# Patient Record
Sex: MALE | Race: WHITE | NOT HISPANIC OR LATINO | Employment: OTHER | URBAN - METROPOLITAN AREA
[De-identification: names, ages, dates, MRNs, and addresses within clinical notes are randomized per-mention and may not be internally consistent; named-entity substitution may affect disease eponyms.]

---

## 2017-03-28 DIAGNOSIS — Z76.82 AWAITING ORGAN TRANSPLANT STATUS: Primary | ICD-10-CM

## 2017-03-29 DIAGNOSIS — Z76.82 ORGAN TRANSPLANT CANDIDATE: Primary | ICD-10-CM

## 2017-05-08 ENCOUNTER — TELEPHONE (OUTPATIENT)
Dept: RADIOLOGY | Facility: HOSPITAL | Age: 52
End: 2017-05-08

## 2017-05-16 ENCOUNTER — LAB VISIT (OUTPATIENT)
Dept: LAB | Facility: HOSPITAL | Age: 52
End: 2017-05-16
Payer: MEDICARE

## 2017-05-16 DIAGNOSIS — Z76.82 ORGAN TRANSPLANT CANDIDATE: ICD-10-CM

## 2017-05-17 ENCOUNTER — TELEPHONE (OUTPATIENT)
Dept: RADIOLOGY | Facility: HOSPITAL | Age: 52
End: 2017-05-17

## 2017-05-18 ENCOUNTER — CLINICAL SUPPORT (OUTPATIENT)
Dept: CARDIOLOGY | Facility: CLINIC | Age: 52
End: 2017-05-18
Payer: MEDICARE

## 2017-05-18 ENCOUNTER — HOSPITAL ENCOUNTER (OUTPATIENT)
Dept: RADIOLOGY | Facility: HOSPITAL | Age: 52
Discharge: HOME | End: 2017-05-18
Attending: INTERNAL MEDICINE
Payer: MEDICARE

## 2017-05-18 DIAGNOSIS — Z76.82 ORGAN TRANSPLANT CANDIDATE: ICD-10-CM

## 2017-05-18 LAB
AORTIC VALVE REGURGITATION: ABNORMAL
DIASTOLIC DYSFUNCTION: NO
ESTIMATED PA SYSTOLIC PRESSURE: 32.38
RETIRED EF AND QEF - SEE NOTES: 60 (ref 55–65)
TRICUSPID VALVE REGURGITATION: ABNORMAL

## 2017-05-18 PROCEDURE — 76776 US EXAM K TRANSPL W/DOPPLER: CPT | Mod: TC,PO,TXP

## 2017-05-18 PROCEDURE — 71020 XR CHEST PA AND LATERAL: CPT | Mod: 26,TXP,, | Performed by: RADIOLOGY

## 2017-05-18 PROCEDURE — 93306 TTE W/DOPPLER COMPLETE: CPT | Mod: 26,S$PBB,TXP, | Performed by: INTERNAL MEDICINE

## 2017-05-18 PROCEDURE — 76776 US EXAM K TRANSPL W/DOPPLER: CPT | Mod: 26,TXP,, | Performed by: RADIOLOGY

## 2017-05-18 PROCEDURE — 76770 US EXAM ABDO BACK WALL COMP: CPT | Mod: 26,TXP,, | Performed by: RADIOLOGY

## 2017-05-18 PROCEDURE — 71020 XR CHEST PA AND LATERAL: CPT | Mod: TC,PO,TXP

## 2017-05-18 PROCEDURE — 76770 US EXAM ABDO BACK WALL COMP: CPT | Mod: TC,PO,TXP

## 2017-07-06 PROCEDURE — 86829 HLA CLASS I/II ANTIBODY QUAL: CPT | Mod: PO,TXP

## 2017-07-06 PROCEDURE — 86829 HLA CLASS I/II ANTIBODY QUAL: CPT | Mod: 91,PO,TXP

## 2017-07-07 LAB — HPRA INTERPRETATION: NORMAL

## 2017-07-13 ENCOUNTER — LAB VISIT (OUTPATIENT)
Dept: LAB | Facility: HOSPITAL | Age: 52
End: 2017-07-13
Payer: MEDICARE

## 2017-07-13 DIAGNOSIS — Z76.82 ORGAN TRANSPLANT CANDIDATE: ICD-10-CM

## 2017-07-13 PROCEDURE — 86833 HLA CLASS II HIGH DEFIN QUAL: CPT | Mod: PO,TXP

## 2017-07-13 PROCEDURE — 86832 HLA CLASS I HIGH DEFIN QUAL: CPT | Mod: PO,TXP

## 2017-07-27 ENCOUNTER — OFFICE VISIT (OUTPATIENT)
Dept: CARDIOLOGY | Facility: CLINIC | Age: 52
End: 2017-07-27
Payer: MEDICARE

## 2017-07-27 ENCOUNTER — OFFICE VISIT (OUTPATIENT)
Dept: TRANSPLANT | Facility: CLINIC | Age: 52
End: 2017-07-27
Payer: MEDICARE

## 2017-07-27 VITALS
TEMPERATURE: 98 F | BODY MASS INDEX: 25.56 KG/M2 | WEIGHT: 168.63 LBS | RESPIRATION RATE: 16 BRPM | SYSTOLIC BLOOD PRESSURE: 120 MMHG | HEART RATE: 63 BPM | HEIGHT: 68 IN | OXYGEN SATURATION: 100 % | DIASTOLIC BLOOD PRESSURE: 66 MMHG

## 2017-07-27 VITALS
WEIGHT: 168.88 LBS | HEIGHT: 68 IN | SYSTOLIC BLOOD PRESSURE: 114 MMHG | DIASTOLIC BLOOD PRESSURE: 64 MMHG | HEART RATE: 67 BPM | BODY MASS INDEX: 25.59 KG/M2

## 2017-07-27 DIAGNOSIS — Z76.82 ORGAN TRANSPLANT CANDIDATE: Primary | ICD-10-CM

## 2017-07-27 DIAGNOSIS — T86.12 FAILED KIDNEY TRANSPLANT: ICD-10-CM

## 2017-07-27 DIAGNOSIS — Q87.81 ALPORT SYNDROME: ICD-10-CM

## 2017-07-27 DIAGNOSIS — Z99.2 ANEMIA IN CHRONIC KIDNEY DISEASE, ON CHRONIC DIALYSIS: Chronic | ICD-10-CM

## 2017-07-27 DIAGNOSIS — N18.5 ANEMIA IN STAGE 5 CHRONIC KIDNEY DISEASE, NOT ON CHRONIC DIALYSIS: Chronic | ICD-10-CM

## 2017-07-27 DIAGNOSIS — D63.1 ANEMIA IN CHRONIC KIDNEY DISEASE, ON CHRONIC DIALYSIS: Chronic | ICD-10-CM

## 2017-07-27 DIAGNOSIS — Z01.818 PREOPERATIVE CLEARANCE: ICD-10-CM

## 2017-07-27 DIAGNOSIS — Z76.82 PATIENT ON WAITING LIST FOR KIDNEY TRANSPLANT: Chronic | ICD-10-CM

## 2017-07-27 DIAGNOSIS — N18.6 ESRD (END STAGE RENAL DISEASE): ICD-10-CM

## 2017-07-27 DIAGNOSIS — Q87.81 ALPORT SYNDROME: Primary | Chronic | ICD-10-CM

## 2017-07-27 DIAGNOSIS — Z76.82 AWAITING ORGAN TRANSPLANT: ICD-10-CM

## 2017-07-27 DIAGNOSIS — D63.1 ANEMIA IN STAGE 5 CHRONIC KIDNEY DISEASE, NOT ON CHRONIC DIALYSIS: Chronic | ICD-10-CM

## 2017-07-27 DIAGNOSIS — N18.6 ANEMIA IN CHRONIC KIDNEY DISEASE, ON CHRONIC DIALYSIS: Chronic | ICD-10-CM

## 2017-07-27 DIAGNOSIS — N18.6 ESRD (END STAGE RENAL DISEASE): Chronic | ICD-10-CM

## 2017-07-27 PROCEDURE — 99999 PR PBB SHADOW E&M-EST. PATIENT-LVL III: CPT | Mod: PBBFAC,TXP,, | Performed by: INTERNAL MEDICINE

## 2017-07-27 PROCEDURE — 99999 PR PBB SHADOW E&M-EST. PATIENT-LVL III: CPT | Mod: PBBFAC,TXP,, | Performed by: NURSE PRACTITIONER

## 2017-07-27 PROCEDURE — 99215 OFFICE O/P EST HI 40 MIN: CPT | Mod: S$PBB,TXP,, | Performed by: NURSE PRACTITIONER

## 2017-07-27 PROCEDURE — 99204 OFFICE O/P NEW MOD 45 MIN: CPT | Mod: S$PBB,TXP,, | Performed by: INTERNAL MEDICINE

## 2017-07-27 PROCEDURE — 99213 OFFICE O/P EST LOW 20 MIN: CPT | Mod: PBBFAC,27,TXP | Performed by: NURSE PRACTITIONER

## 2017-07-27 NOTE — LETTER
July 27, 2017      Gunnar Fernando MD  [address]  [facility] [address]           [facility]  [address]  [facility] [address]  Phone: [phone]          Patient: Erasto Low   MR Number: [id number]   YOB: 1965   Date of Visit: 7/27/2017       Dear Dr. Gunnar Fernando:    Thank you for referring Erasto Low to me for evaluation. Attached you will find relevant portions of my assessment and plan of care.    If you have questions, please do not hesitate to call me. I look forward to following Erasto Low along with you.    Sincerely,    Yana Grewal MD    Enclosure  CC:  No Recipients    If you would like to receive this communication electronically, please contact [email or web address] or [phone] to request more information on [employer] access.    For providers and/or their staff who would like to refer a patient to [facility], please contact us through our one-stop-shop provider referral line, [facility], at [phone].    If you feel you have received this communication in error or would no longer like to receive these types of communications, please e-mail [email or web address]

## 2017-07-27 NOTE — PROGRESS NOTES
Kidney Transplant Recipient Reevalulation    Referring Physician:   Current Nephrologist: Markel Roman  Waitlist Status: active  Dialysis Start Date: 2005    Subjective:     CC:  Six month reassessment of kidney transplant candidacy.    HPI:  Mr. Low is a 51 y.o. 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  He has been on the wait list for a kidney transplant at [facility] since 2003. Patient is currently on hemodialysis started on  2005. Patient is dialyzing on MWF schedule.  Patient reports that he is tolerating dialysis well.. He has a LUE AV fistula. Patient denies any recent hospitalizations or ED visits.    Cardiology f/u today  Recent labs and diagnostic tests reviewed    Overall feels well. No health concerns today.   Denies chest pain, SOB, leg pain, abdominal pain or LUTs.    Past Medical History:   Diagnosis Date    Alport syndrome     Anemia in chr kidney dis     Awaiting organ transplant 10/2/2014    Failed kidney transplant #1 LRD from mother 10/84-     Failed kidney transplant #2  donor 2001-2005     HTN (hypertension) 2014    Osteoporosis, unspecified     Renal dialysis status     Renal hypertension        Review of Systems   Constitutional: Negative for activity change, appetite change, chills, fatigue, fever and unexpected weight change.   HENT: Negative for congestion, facial swelling, postnasal drip, rhinorrhea, sinus pressure, sore throat and trouble swallowing.    Eyes: Negative for pain, redness and visual disturbance.   Respiratory: Negative for cough, chest tightness, shortness of breath and wheezing.    Cardiovascular: Negative.  Negative for chest pain, palpitations and leg swelling.   Gastrointestinal: Negative for abdominal pain, diarrhea, nausea and vomiting.   Genitourinary: Negative for dysuria, flank pain and urgency.   Musculoskeletal: Negative for gait problem, neck pain and neck stiffness.  "  Skin: Negative for rash.   Allergic/Immunologic: Negative for environmental allergies, food allergies and immunocompromised state.   Neurological: Negative for dizziness, weakness, light-headedness and headaches.   Psychiatric/Behavioral: Negative for agitation and confusion. The patient is not nervous/anxious.        Objective:   body mass index is 25.64 kg/m².  /66 (BP Location: Right arm, Patient Position: Sitting, BP Method: Automatic)   Pulse 63   Temp 97.8 °F (36.6 °C) (Oral)   Resp 16   Ht 5' 8" (1.727 m)   Wt 76.5 kg (168 lb 10.4 oz)   SpO2 100%   BMI 25.64 kg/m²     Physical Exam   Constitutional: He is oriented to person, place, and time. He appears well-developed and well-nourished.   HENT:   Head: Normocephalic.   Mouth/Throat: Oropharynx is clear and moist. No oropharyngeal exudate.   Eyes: Conjunctivae and EOM are normal. Pupils are equal, round, and reactive to light. No scleral icterus.   Neck: Normal range of motion. Neck supple.   Cardiovascular: Normal rate, regular rhythm and normal heart sounds.    Pulmonary/Chest: Effort normal and breath sounds normal.   Abdominal: Soft. Normal appearance and bowel sounds are normal. He exhibits no distension and no mass. There is no splenomegaly or hepatomegaly. There is no tenderness. There is no rebound, no guarding, no CVA tenderness, no tenderness at McBurney's point and negative Kerr's sign.   Musculoskeletal: Normal range of motion. He exhibits no edema.        Arms:  Lymphadenopathy:     He has no cervical adenopathy.   Neurological: He is alert and oriented to person, place, and time. He exhibits normal muscle tone. Coordination normal.   Skin: Skin is warm and dry.   Psychiatric: He has a normal mood and affect. His behavior is normal.   Vitals reviewed.       Labs:  Lab Results   Component Value Date    WBC 5.87 04/11/2016    HGB 9.1 (L) 04/11/2016    HCT 27.2 (L) 04/11/2016     (L) 04/11/2016    K 3.7 04/11/2016    CL 92 (L) " 2016    CO2 24 2016    BUN 69 (H) 2016    CREATININE 15.9 (H) 2016    EGFRNONAA 3 (A) 2016    CALCIUM 8.0 (L) 2016    PHOS 4.1 04/10/2016    MG 2.5 04/10/2016    ALBUMIN 2.8 (L) 2016    AST 24 2016    ALT 18 2016       No results found for: PREALBUMIN, BILIRUBINUA, GGT, AMYLASE, LIPASE, PROTEINUA, NITRITE, RBCUA, WBCUA    No results found for: HLAABCTYPE    Lab Results   Component Value Date    CPRA 98 2017    RB0HXYO  2017     B42,B67,Cw7,B81,B56,B55,B7,B73,B41,Cw16,A30,B27,A31,Cw8,Cw17,B54,B39,A23,B82,B48,B60,B8,B3B,B59,B37,A29,B61,B62,B49,B50,B13,B71,B72    CIABCLM A*29:02-- WEAK B76, CW12, A*24:03, B47 2017    CIIAB DR7,DR12,DQ5,DQ6,DQA1*01:01,DQA1*01:02,DQA1*01:03 2017    ABCMT WEAK DP14,        Labs were reviewed with the patient.    Pre-transplant Workup:   Reviewed with the patient.    Assessment:     1. Alport syndrome    2. Patient on waiting list for kidney transplant    3. Awaiting organ transplant    4. ESRD from Alport Syndrome, on RRT since 10/1984    5. Renal hypertension, stage 5 chronic kidney disease or end stage renal disease    6. Anemia in stage 5 chronic kidney disease, not on chronic dialysis    7. Failed kidney transplant X2 ( #1 LRD/mother 10/1984 and #2  donor 2001-2005)        Plan:     Transplant Candidacy:   Mr. Low is a suitable kidney transplant candidate.  He remains in overall stable health, and will remain active on the transplant list.    Mirian Jones NP       Follow-up:   In addition to the tests noted in the plan, Mr. Low will continue to have reevaluation as per the standing pre-kidney transplant protocol:  1. Monthly blood for PRA  2. Annual return to clinic, except HIV positive, > 65 years of age, or pancreas transplant candidates who will be scheduled to see transplant every 6 months while in pre-transplant phase  3. Annual re-testing: CXR, EKG, yearly  mammograms for women over 40 and PSA for males over 40, cardiology follow-up as recommended by initial cardiology pre-transplant evaluation  4. Renal ultrasound every 2 years  5. Baseline colonoscopy after age 50 and repeated as recommended    UNOS Patient Status  Functional Status: 60% - Requires occasional assistance but is able to care for needs  Physical Capacity: No Limitations

## 2017-07-27 NOTE — PROGRESS NOTES
Transplant Recipient Adult Psychosocial Assessment    Erasto Low  [address]  [facility] [address]    Telephone Information:   Mobile [phone]   Home  [phone] (home)  Work  There is no work phone number on file.  E-mail  No e-mail address on record    Sex: male  YOB: 1965  Age: 51 y.o.    Encounter Date: 7/27/2017  [place] Citizen: yes  Primary Language: English   Needed: no    Emergency Contact:  Name: Nerissa Pathak  Relationship: mother  Address: [address] [place] [address]  Phone Numbers:  [phone] (mobile)    Family/Social Support:   Number of dependents/: 0  Marital history: Single  Other family dynamics: Pt reported his mother, father, and two brothers are very supportive. Brothers:     Chris Pathak- [phone] (Lives in [place], [place])- Pt reported Chris received a kidney transplant about 4 years ago from [facility].   Brad Low- [phone] (Lives in [place], [place])     Household Composition:    Pt lives alone.    Do you and your caregivers have access to reliable transportation? yes  PRIMARY CAREGIVER: Nerissa Pathak will be primary caregiver, phone number [phone]. Pt's mother reported she had a kidney transplant about 8 years ago.      provided in-depth information to patient and caregiver regarding pre- and post-transplant caregiver role.   strongly encourages patient and caregiver to have concrete plan regarding post-transplant care giving, including back-up caregiver(s) to ensure care giving needs are met as needed.    Patient and Caregiver states understanding all aspects of caregiver role/commitment and is able/willing/committed to being caregiver to the fullest extent necessary.    Patient and Caregiver verbalizes understanding of the education provided today and caregiver responsibilities.         remains available. Patient and Caregiver agree to contact  in a timely  manner if concerns arise.      Able to take time off work without financial concerns: yes.     Additional Significant Others who will Assist with Transplant:  Name: Nick Pathak- [phone]. Pt's father was present for SW visit and verbalized commitment as backup caregiver.   Age: 69   City: [place] State: [place]  Relationship: father  Does person drive? yes    Name: Chris Pathak  Age: Not provided  City: [place] State: [place]  Relationship: brother  Does person drive? yes    Name: Brad Low   Age: Not provided  City: [place] State: [place]  Relationship: brother  Does person drive? yes    Living Will: no  Healthcare Power of : no  Advance Directives on file: <<no information> per medical record.  Verbally reviewed LW/HCPA information.   provided patient with copy of LW/HCPA documents and provided education on completion of forms.    Living Donors: No.    Highest Education Level: High School (9-12) or GED  Reading Ability: 12th grade  Reports difficulty with: N/A  Learns Best By:  Multi-modal instruction (written, hands on, and demonstration)     Status: no  VA Benefits: no     Working for Income: No  If no, reason not working: Disability    Spouse/Significant Other Employment: N/A    Disabled: yes: date disability began: , due to: ESRD. Pt has been transplanted x2 at [facility] in  and .    Monthly Income:  SS Disability: $800  Able to afford all costs now and if transplanted, including medications: yes. Pt reported he does not have any trouble affording medications at this time.   Patient and Caregiver verbalizes understanding of personal responsibilities related to transplant costs and the importance of having a financial plan to ensure that patients transplant costs are fully covered.      provided fundraising information/education.  Patient and Caregiver verbalizes understanding.   remains available.    Insurance:   Payor/Plan Subscr   "Sex Relation Sub. Ins. ID Effective Group Num   1. MEDICARE - [place]* ADRIEL RUBY 1965 Male  [id number] 8/1/1984                                    [address]   2. AETNA - AETNA* ADRIEL RUBY 1965 Male  [id number] 6/1/13 [id number]                                   [address], [place] [address]     Primary Insurance (for UNOS reporting): Public Insurance - Medicare FFS (Fee For Service)  Secondary Insurance (for UNOS reporting): Private Insurance  Patient and Caregiver verbalizes clear understanding that patient may experience difficulty obtaining and/or be denied insurance coverage post-surgery. This includes and is not limited to disability insurance, life insurance, health insurance, burial insurance, long term care insurance, and other insurances.    Patient and Caregiver also reports understanding that future health concerns related to or unrelated to transplantation may not be covered by patient's insurance.  Resources and information provided and reviewed.      Patient and Caregiver provides verbal permission to release any necessary information to outside resources for patient care and discharge planning.  Resources and information provided are reviewed.      Dialysis Adherence:  Patient and Caregiver reports high adherence. Pt reported he never misses treatment. Pt currently receiving treatment at [address] Ph: [phone]    Infusion Service: patient utilizing? no  Home Health: patient utilizing? no  DME: no  Pulmonary/Cardiac Rehab: no   ADLS:  Pt reported he is fully independent and still driving.     Adherence: Pt's adherence is medium-high. Pt reported he complies with all medication regimes and makes all medical appointments. Pt reported he follows all medical advice with the exception of tobacco use. Pt reported he started "dipping" since he was young and does not plan to stop.  Adherence education and counseling provided. " "    Per History Section:Past Medical History:   Diagnosis Date    Alport syndrome     Anemia in chr kidney dis     Awaiting organ transplant 10/2/2014    Failed kidney transplant #1 LRD from mother 10/84-     Failed kidney transplant #2  donor 2001-2005     HTN (hypertension) 2014    Osteoporosis, unspecified     Renal dialysis status     Renal hypertension      Social History   Substance Use Topics    Smoking status: Never Smoker    Smokeless tobacco: Current User    Alcohol use Yes      Comment: rarely     History   Drug Use No     History   Sexual Activity    Sexual activity: Not on file       Per Today's Psychosocial:  Tobacco: Patient reports "dipping" 1 can of tobacco per day.  Patient does not plan to quit.  Alcohol: none, patient denies any use.  Illicit Drugs/Non-prescribed Medications: none, patient denies any use.    Patient and Caregiver states clear understanding of the potential impact of substance use as it relates to transplant candidacy and is aware of possible random substance screening.  Substance abstinence/cessation counseling, education and resources provided and reviewed.     Arrests/DWI/Treatment/Rehab: patient denies    Psychiatric History:    Mental Health: pt denied any mental health issues at this time.   Psychiatrist/Counselor: none  Medications:  none  Suicide/Homicide Issues: pt denied past/current SI/HI   Safety at home: Pt reported he is free from all abuse including but not limited to physical, verbal, and/or sexual abuse.     Knowledge: Patient and Caregiver states having clear understanding and realistic expectations regarding the potential risks and potential benefits of organ transplantation and organ donation, agrees to discuss with health care team members and support system members and to utilize available resources and express questions and/or concerns in order to further facilitate the pt informed decision-making.  Resources and " information provided and reviewed.     Patient and Caregiver is aware of [facility]'s affiliation and/or partnership with agencies in home health care, LTAC, SNF, [facility], and other hospitals and clinics.    Understanding: Patient and Caregiver reports having a clear understanding of the many lifetime commitments involved with being a transplant recipient, including costs, compliance, medications, lab work, procedures, appointments, concrete and financial planning, preparedness, timely and appropriate communication of concerns, abstinence (ETOH, tobacco, illicit non-prescribed drugs), adherence to all health care team recommendations, support system and caregiver involvement, appropriate and timely resource utilization and follow-through, mental health counseling as needed/recommended, and patient and caregiver responsibilities.  Social Service Handbook, resources and detailed educational information provided and reviewed.  Educational information provided.    Patient and Caregiver also reports current and expected compliance with health care regime and states having a clear understanding of the importance of compliance.      Patient and Caregiver reports a clear understanding that risks and benefits may be involved with organ transplantation and with organ donation.      Patient and Caregiver also reports clear understanding that psychosocial risk factors may affect patient, and include but are not limited to feelings of depression, generalized anxiety, anxiety regarding dependence on others, post traumatic stress disorder, feelings of guilt and other emotional and/or mental concerns, and/or exacerbation of existing mental health concerns.  Detailed resources provided and discussed.     Patient and Caregiver agrees to access appropriate resources in a timely manner as needed and/or as recommended, and to communicate concerns appropriately.  Patient and Caregiver also reports a clear understanding of treatment options  "available.      reviewed education, provided additional information, and answered questions.    Feelings or Concerns: Pt denied any feelings/concerns at this time. Pt stated "I don't really have kidney transplant on my mind. I know I need it but I would rather just keep living my life without constantly thinking about it. If I get a call for a kidney I get it, if I don't, I don't."     Coping: Pt reported coping well with aid of family and staying busy. Pt reported he enjoys hunting and training his dogs. 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     Goals: Pt reported he would like to go on vacation without worrying about his HD tx.  Patient referred to Vocational Rehabilitation.    Interview Behavior: Patient and Caregiver presents as alert and oriented x 4, pleasant, good eye contact, well groomed, recall good, concentration/judgement good, average intelligence, calm, communicative, cooperative and asking and answering questions appropriately.          Transplant Social Work - Candidacy  Assessment/Plan:     Psychosocial Suitability: Patient presents as a suitable candidate for kidney transplant at this time. Based on psychosocial risk factors, patient presents as low risk, due to good social support, adequate caregiver plan with multiple committed caregivers, hx of x2 kidney transplant, multiple family members with hx of kidney transplant, no reported alcohol/drug abuse, and no reported mental health issues.   Although pt has limited finances pt reported he has not had difficulty affording his medications in the past with his previous kidney transplant.     Recommendations/Additional Comments:   -Fundraising  -Tobacco Cessation  -Local [facility]    Alicia Berger LMSW         "

## 2017-07-27 NOTE — LETTER
July 28, 2017        Markel Roman  [address]  [address]  [facility]   [place] [address]  Phone: [phone]  Fax: [phone]             [facility]- Transplant  [address]  [facility] [address]  Phone: [phone]   Patient: Erasto Low   MR Number: [id number]   YOB: 1965   Date of Visit: 7/27/2017       Dear Dr. Markel Roman    Thank you for referring Erasto Low to me for evaluation. Attached you will find relevant portions of my assessment and plan of care.    If you have questions, please do not hesitate to call me. I look forward to following Erasto Low along with you.    Sincerely,    Mirian Jones, NP    Enclosure    If you would like to receive this communication electronically, please contact [email or web address] or [phone] to request [employer] Link access.    [employer] Link is a tool which provides read-only access to select patient information with whom you have a relationship. Its easy to use and provides real time access to review your patients record including encounter summaries, notes, results, and demographic information.    If you feel you have received this communication in error or would no longer like to receive these types of communications, please e-mail [email or web address]

## 2017-07-27 NOTE — PROGRESS NOTES
"Subjective:   Patient ID:  Erasto Low is a 51 y.o. 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 \  Non smoker    NMSPECT  2016  Impression: NORMAL MYOCARDIAL PERFUSION  1. The perfusion scan is free of evidence for myocardial ischemia or injury.   2. Resting wall motion is physiologic.   3. Resting LV function is normal.   4. The ventricular volumes are normal at rest and stress.   5. The extracardiac distribution of radioactivity is normal.   6. There is evidence of right ventricular hypertrophy.     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, on RRT since 10/1984    Alport syndrome    Renal hypertension    Osteoporosis, unspecified    Anemia in chronic renal disease    Renal dialysis status    Awaiting organ transplant    Special screening for malignant neoplasms, colon    Colon cancer screening    Diverticulosis of colon (without mention of hemorrhage)    Benign neoplasm of colon    Fever    Bacteremia due to Staphylococcus    Endocarditis    Bacteremia due to Streptococcus     /64 (BP Location: Right arm, Patient Position: Sitting, BP Method: Automatic)   Pulse 67   Ht 5' 8" (1.727 m)   Wt 76.6 kg (168 lb 14 oz)   BMI 25.68 kg/m²   Body mass index is 25.68 kg/m².  CrCl cannot be calculated (Patient's most recent sCr result is older than the maximum 7 days allowed.).    Lab Results   Component Value Date     (L) 04/11/2016    K 3.7 04/11/2016    CL 92 (L) 04/11/2016    CO2 24 04/11/2016    BUN 69 (H) 04/11/2016    CREATININE 15.9 (H) 04/11/2016    GLU 96 04/11/2016    MG 2.5 04/10/2016    AST 24 04/11/2016    ALT 18 04/11/2016    ALBUMIN 2.8 (L) 04/11/2016    PROT 6.6 04/11/2016    BILITOT 0.4 04/11/2016    WBC " 5.87 04/11/2016    HGB 9.1 (L) 04/11/2016    HCT 27.2 (L) 04/11/2016    MCV 83 04/11/2016     04/11/2016    INR 1.1 10/19/2015    PSA 1.8 03/03/2016    CHOL 206 (H) 05/09/2013    HDL 43 05/09/2013    LDLCALC 120.0 05/09/2013    TRIG 214 (H) 05/09/2013       Current Outpatient Prescriptions   Medication Sig    amlodipine (NORVASC) 5 MG tablet Take 5 mg by mouth once daily.    clonazepam (KLONOPIN) 0.5 MG tablet Take 0.5 mg by mouth Once daily as needed. 1 tablet Oral    multivitamin capsule Take 1 capsule by mouth once daily.     No current facility-administered medications for this visit.        Review of Systems   Constitution: Negative for chills, decreased appetite, weakness, malaise/fatigue, night sweats, weight gain and weight loss.   Eyes: Negative for blurred vision, double vision, visual disturbance and visual halos.   Cardiovascular: Negative for chest pain, claudication, cyanosis, dyspnea on exertion, irregular heartbeat, leg swelling, near-syncope, orthopnea, palpitations, paroxysmal nocturnal dyspnea and syncope.   Respiratory: Negative for cough, hemoptysis, snoring, sputum production and wheezing.    Endocrine: Negative for cold intolerance, heat intolerance, polydipsia and polyphagia.   Hematologic/Lymphatic: Negative for adenopathy and bleeding problem. Does not bruise/bleed easily.   Skin: Negative for flushing, itching, poor wound healing and rash.   Musculoskeletal: Negative for arthritis, back pain, falls, gout, joint pain, joint swelling, muscle cramps, muscle weakness, myalgias, neck pain and stiffness.   Gastrointestinal: Negative for bloating, abdominal pain, anorexia, diarrhea, dysphagia, excessive appetite, flatus, hematemesis, jaundice, melena and nausea.   Genitourinary: Negative for hesitancy and incomplete emptying.   Neurological: Negative for aphonia, brief paralysis, difficulty with concentration, disturbances in coordination, excessive daytime sleepiness, dizziness, focal  weakness, light-headedness and loss of balance.   Psychiatric/Behavioral: Negative for altered mental status, depression, hallucinations, hypervigilance, memory loss, substance abuse and suicidal ideas. The patient does not have insomnia and is not nervous/anxious.        Objective:   Physical Exam   Constitutional: He is oriented to person, place, and time. He appears well-developed and well-nourished. No distress.   HENT:   Head: Normocephalic and atraumatic.   Nose: Nose normal.   Mouth/Throat: No oropharyngeal exudate.   Eyes: Conjunctivae and EOM are normal. Pupils are equal, round, and reactive to light. Right eye exhibits no discharge. Left eye exhibits no discharge. No scleral icterus.   Neck: Normal range of motion. Neck supple. No JVD present. No tracheal deviation present. No thyromegaly present.   Cardiovascular: Normal rate, regular rhythm, normal heart sounds and intact distal pulses.  Exam reveals no gallop and no friction rub.    No murmur heard.  Pulmonary/Chest: Effort normal and breath sounds normal. No stridor. No respiratory distress. He has no wheezes. He has no rales. He exhibits no tenderness.   Abdominal: Soft. Bowel sounds are normal. He exhibits no distension and no mass. There is no tenderness.   Musculoskeletal: He exhibits no edema or tenderness.   Lymphadenopathy:     He has no cervical adenopathy.   Neurological: He is alert and oriented to person, place, and time. He displays normal reflexes. No cranial nerve deficit. He exhibits normal muscle tone. Coordination normal.   Skin: Skin is warm. No rash noted. He is not diaphoretic. No erythema. No pallor.   Psychiatric: He has a normal mood and affect. His behavior is normal. Judgment and thought content normal.       Assessment:     1. Organ transplant candidate    2. Preoperative clearance    3. ESRD (end stage renal disease)    4. Alport syndrome    5. Anemia in chronic kidney disease, on chronic dialysis        Plan:   Patient has  has 4 negative NM stress tests in the last few years, he is very active with no cardiac sx,. Patient is low risk to moderate risk for perioperative MI can proceed with non cardiac surgery.   We discussed high LDL in the setting of ESRD and will benefit by statin, patient's father says that he would like to get this discussed with PCP which the son agreed with.  Counseled on importance of heart healthy diet low in saturated and trans fat and salt as well gradually starting a regular aerobic exercise regimen with goal of 30min 5x/week. Recommend BP diary. Call if systolic BP > 140 mmHg on checking repeatedly  All meds reviewed and are appropriate  Patient is compliant with his medications.      RTC PRN

## 2017-07-27 NOTE — PROGRESS NOTES
Patient was seen in listed [facility] transplant clinic for continued evaluation for kidney, kidney/pancreas or pancreas only transplant. The patient attended a group education session that discussed/reviewed the following aspects of transplantation: evaluation and selection committee process, UNOS waitlist management/multiple listings, types of organs offered (KDPI < 85%, KDPI > 85%, PHS increased risk, DCD), financial aspects, surgical procedures, dietary instruction pre- and post-transplant, health maintenance pre- and post-transplant, post-transplant hospitalization and outpatient follow-up, potential to participate in a research protocol, and medication management and side effects. A question and answer session was provided after the presentation.     The patient was seen by all members of the multi-disciplinary team to include: Nephrologist/PA, , Transplant Coordinator, and .      The patient reviewed and signed all consents for evaluation which were witnessed and sent to scanning into the EPIC chart.     The patient was given an education book and plan for further evaluation based on her individual assessment.      The patient was encouraged to call with any questions or concerns.

## 2017-07-31 DIAGNOSIS — Z76.82 ORGAN TRANSPLANT CANDIDATE: ICD-10-CM

## 2017-08-08 LAB — HPRA INTERPRETATION: NORMAL

## 2017-08-16 LAB
CLASS I ANTIBODIES - LUMINEX: NORMAL
CLASS I ANTIBODY COMMENTS - LUMINEX: NORMAL
CLASS II ANTIBODIES - LUMINEX: NORMAL
CLASS II ANTIBODY COMMENTS - LUMINEX: NORMAL
CPRA %: 95
SERUM COLLECTION DT - LUMINEX CLASS I: NORMAL
SERUM COLLECTION DT - LUMINEX CLASS II: NORMAL
SPCL1 TESTING DATE: NORMAL
SPCL2 TESTING DATE: NORMAL
SPCLU TESTING DATE: NORMAL

## 2017-10-03 ENCOUNTER — TELEPHONE (OUTPATIENT)
Dept: TRANSPLANT | Facility: CLINIC | Age: 52
End: 2017-10-03

## 2017-10-03 NOTE — TELEPHONE ENCOUNTER
I spoke with DR Gentile , the patient's doctor c/o an elevated PSA and MRI, which was indeterminate to differentiate prostate cancer vs BPH. IT is recommended to do the Prostate BX at this time. The patient will need to be cleared from urology c/o this.

## 2017-10-27 ENCOUNTER — LAB VISIT (OUTPATIENT)
Dept: LAB | Facility: HOSPITAL | Age: 52
End: 2017-10-27
Payer: MEDICARE

## 2017-10-27 DIAGNOSIS — Z76.82 ORGAN TRANSPLANT CANDIDATE: ICD-10-CM

## 2017-10-27 PROCEDURE — 86833 HLA CLASS II HIGH DEFIN QUAL: CPT | Mod: PO,TXP

## 2017-10-27 PROCEDURE — 86832 HLA CLASS I HIGH DEFIN QUAL: CPT | Mod: PO,TXP

## 2017-11-15 LAB — HPRA INTERPRETATION: NORMAL

## 2017-11-20 LAB
CLASS I ANTIBODIES - LUMINEX: NORMAL
CLASS I ANTIBODY COMMENTS - LUMINEX: NORMAL
CLASS II ANTIBODIES - LUMINEX: NORMAL
CLASS II ANTIBODY COMMENTS - LUMINEX: NORMAL
CPRA %: 94
SERUM COLLECTION DT - LUMINEX CLASS I: NORMAL
SERUM COLLECTION DT - LUMINEX CLASS II: NORMAL
SPCL1 TESTING DATE: NORMAL
SPCL2 TESTING DATE: NORMAL
SPCLU TESTING DATE: NORMAL

## 2017-11-28 ENCOUNTER — HOSPITAL ENCOUNTER (OUTPATIENT)
Dept: RADIOLOGY | Facility: HOSPITAL | Age: 52
Discharge: HOME | End: 2017-11-28
Attending: FAMILY MEDICINE
Payer: MEDICARE

## 2017-11-28 ENCOUNTER — OFFICE VISIT (OUTPATIENT)
Dept: INTERNAL MEDICINE | Facility: CLINIC | Age: 52
End: 2017-11-28
Payer: MEDICARE

## 2017-11-28 VITALS
SYSTOLIC BLOOD PRESSURE: 118 MMHG | TEMPERATURE: 97 F | WEIGHT: 173.5 LBS | HEART RATE: 70 BPM | BODY MASS INDEX: 24.84 KG/M2 | DIASTOLIC BLOOD PRESSURE: 70 MMHG | OXYGEN SATURATION: 96 % | HEIGHT: 70 IN

## 2017-11-28 DIAGNOSIS — Z01.818 PRE-OPERATIVE CLEARANCE: ICD-10-CM

## 2017-11-28 DIAGNOSIS — T86.12 FAILED KIDNEY TRANSPLANT: Primary | ICD-10-CM

## 2017-11-28 DIAGNOSIS — N18.6 END STAGE RENAL DISEASE: ICD-10-CM

## 2017-11-28 DIAGNOSIS — C61 PROSTATE CANCER: ICD-10-CM

## 2017-11-28 PROCEDURE — 71020 XR CHEST PA AND LATERAL: CPT | Mod: 26,NTX,, | Performed by: RADIOLOGY

## 2017-11-28 PROCEDURE — 99213 OFFICE O/P EST LOW 20 MIN: CPT | Mod: S$PBB,,, | Performed by: FAMILY MEDICINE

## 2017-11-28 PROCEDURE — 93005 ELECTROCARDIOGRAM TRACING: CPT | Mod: PBBFAC | Performed by: FAMILY MEDICINE

## 2017-11-28 PROCEDURE — 93010 ELECTROCARDIOGRAM REPORT: CPT | Mod: ,,, | Performed by: INTERNAL MEDICINE

## 2017-11-28 PROCEDURE — 71020 XR CHEST PA AND LATERAL: CPT | Mod: TC,TXP

## 2017-11-28 PROCEDURE — 99999 PR PBB SHADOW E&M-EST. PATIENT-LVL IV: CPT | Mod: PBBFAC,,, | Performed by: FAMILY MEDICINE

## 2017-11-28 PROCEDURE — 99214 OFFICE O/P EST MOD 30 MIN: CPT | Mod: PBBFAC,25,NTX | Performed by: FAMILY MEDICINE

## 2017-11-28 RX ORDER — LEVOFLOXACIN 750 MG/1
TABLET ORAL
Refills: 0 | COMMUNITY
Start: 2017-10-04 | End: 2019-03-26 | Stop reason: ALTCHOICE

## 2017-11-28 RX ORDER — CLONAZEPAM 0.5 MG/1
0.5 TABLET ORAL
COMMUNITY

## 2017-11-28 RX ORDER — MULTIVITAMIN
1 TABLET ORAL
COMMUNITY

## 2017-11-28 RX ORDER — SUCROFERRIC OXYHYDROXIDE 500 MG/1
TABLET, CHEWABLE ORAL
Refills: 1 | COMMUNITY
Start: 2017-08-30 | End: 2021-02-02

## 2017-11-28 RX ORDER — AMLODIPINE BESYLATE 5 MG/1
5 TABLET ORAL
COMMUNITY

## 2017-11-28 RX ORDER — DEXTROMETHORPHAN HYDROBROMIDE, GUAIFENESIN 5; 100 MG/5ML; MG/5ML
650 LIQUID ORAL
COMMUNITY

## 2017-11-28 NOTE — PROGRESS NOTES
Subjective:       Patient ID: Erasto Low is a 51 y.o. male.    Chief Complaint: Establish Care (to establish care ) and Pre-op Exam (pt needs preop clearance for cancer sx)    HPI     52 yo M    ESRD 2'2 Alport    Presents for Pre-op Clearance. Prostate Cancer    Alport Syndrome -h/o of 2 separate renal transplants.  First renal transplant - 1984 -   2 transplants - both have quit.  Has been on dialysis for many years. [facility] - dialysis schedule - in [place].    Dr. Sweeney is Cardiologist.  Reports history of potential endocarditis - reports no valvular damage. 1-2 years ago.    Prostate Cancer -   Found in [place] during work up for transplant.  Set for Dec 5th -   Dr. Fili Hernandez - [facility].  Fax [phone] phone [phone]    Trains dogs.  Active  No limitation   States he would be able to walk a mile without problems.  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  Denies any chest pain. Denies any shortness of breath.  Legs occasionally swell - if on feet from morning to dark - but not all the time.  No problems lying flat at night.  Never smoked.    Has had 4 negative stress test during past few years.     2D Echo - 5/18/2017  CONCLUSIONS     1 - No wall motion abnormalities.     2 - Normal left ventricular systolic function (EF 60-65%).     3 - Normal left ventricular diastolic function.     4 - Normal right ventricular systolic function .     5 - The estimated PA systolic pressure is 32 mmHg.     6 - Mild to moderate aortic regurgitation.     7 - Trivial tricuspid regurgitation.                Review of Systems   Constitutional: Negative for chills and fever.   HENT: Negative for sore throat and trouble swallowing.    Eyes: Negative for visual disturbance.   Respiratory: Negative for cough and shortness of breath.    Cardiovascular: Negative for chest pain and palpitations.   Genitourinary:        Makes no urine   Musculoskeletal: Negative for gait problem.   Skin: Negative for color change and rash.    Neurological: Negative for dizziness and light-headedness.       Objective:       Vitals:    17 0811   BP: 118/70   Pulse: 70   Temp: 97 °F (36.1 °C)       Physical Exam   Constitutional: He is oriented to person, place, and time. He appears well-developed and well-nourished. No distress.   HENT:   Head: Normocephalic and atraumatic.   Right Ear: Hearing, tympanic membrane, external ear and ear canal normal.   Left Ear: Hearing, tympanic membrane, external ear and ear canal normal.   Nose: Nose normal. Right sinus exhibits no maxillary sinus tenderness and no frontal sinus tenderness. Left sinus exhibits no maxillary sinus tenderness and no frontal sinus tenderness.   Mouth/Throat: Uvula is midline, oropharynx is clear and moist and mucous membranes are normal.   Eyes: Conjunctivae are normal. Right eye exhibits no discharge. Left eye exhibits no discharge.   Neck: Trachea normal, normal range of motion and full passive range of motion without pain.   Cardiovascular: Normal rate, regular rhythm, normal heart sounds and intact distal pulses.    Appreciate shunt flow murmur   Pulmonary/Chest: Effort normal and breath sounds normal. No respiratory distress. He has no decreased breath sounds. He has no wheezes.   Abdominal: Soft. Normal appearance and bowel sounds are normal. He exhibits no distension and no mass. There is no tenderness. There is no guarding. No hernia.   Musculoskeletal: Normal range of motion. He exhibits no edema or deformity.   Lymphadenopathy:     He has no cervical adenopathy.   Neurological: He is alert and oriented to person, place, and time.   Skin: Skin is warm, dry and intact. No rash noted. No erythema. No pallor.   Psychiatric: He has a normal mood and affect. His speech is normal and behavior is normal. Thought content normal.       Assessment:       1. 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 Prostate cancer    3.  Pre-operative clearance    4. End stage renal disease         Plan:   Failed kidney transplant X2 ( #1 LRD/mother 10/1984 and #2  donor 2001-2005)    Prostate cancer  Scheduled for robotic surgery  Dec 5th, 2017 - [facility].    Pre-operative clearance  Recently cleared 2017 by Cardiology  Has had numerous negative NM testing.  Patient reports he is fully functional - outside of dialysis and stays active with no exertional symptoms.  Called and discussed with Cardiology.  Cleared for surgery pending labs and CXR  Will provide surgeons.    Patient has had numerous surgeries and tolerated all without problem.  -     EKG 12-lead  -     X-Ray Chest PA And Lateral; Future; Expected date: 2017  -     CBC auto differential; Future; Expected date: 2017  -     Comprehensive metabolic panel; Future; Expected date: 2017  -     Protime-INR; Future; Expected date: 2017    End stage renal disease   -     Protime-INR; Future; Expected date: 2017  On transplant list.      No Follow-up on file.

## 2017-11-30 ENCOUNTER — TELEPHONE (OUTPATIENT)
Dept: INTERNAL MEDICINE | Facility: CLINIC | Age: 52
End: 2017-11-30

## 2017-11-30 NOTE — TELEPHONE ENCOUNTER
----- Message from Maite Zelaya sent at 11/29/2017  8:39 AM CST -----  Contact: Evelyn/[facility]  Caller request a call from the nurse regarding a corrective report on the pt, please contact the caller at [phone]

## 2018-02-02 ENCOUNTER — LAB VISIT (OUTPATIENT)
Dept: LAB | Facility: HOSPITAL | Age: 53
End: 2018-02-02
Payer: MEDICARE

## 2018-02-02 DIAGNOSIS — Z76.82 ORGAN TRANSPLANT CANDIDATE: ICD-10-CM

## 2018-02-02 PROCEDURE — 86832 HLA CLASS I HIGH DEFIN QUAL: CPT | Mod: PO,TXP

## 2018-02-02 PROCEDURE — 86833 HLA CLASS II HIGH DEFIN QUAL: CPT | Mod: PO,TXP

## 2018-02-08 LAB — HPRA INTERPRETATION: NORMAL

## 2018-02-12 LAB
CLASS I ANTIBODIES - LUMINEX: NORMAL
CLASS I ANTIBODY COMMENTS - LUMINEX: NORMAL
CLASS II ANTIBODIES - LUMINEX: NORMAL
CLASS II ANTIBODY COMMENTS - LUMINEX: NORMAL
CPRA %: 90
SERUM COLLECTION DT - LUMINEX CLASS I: NORMAL
SERUM COLLECTION DT - LUMINEX CLASS II: NORMAL
SPCL1 TESTING DATE: NORMAL
SPCL2 TESTING DATE: NORMAL
SPCLU TESTING DATE: NORMAL

## 2018-05-01 ENCOUNTER — LAB VISIT (OUTPATIENT)
Dept: LAB | Facility: HOSPITAL | Age: 53
End: 2018-05-01
Payer: MEDICARE

## 2018-05-01 DIAGNOSIS — Z76.82 ORGAN TRANSPLANT CANDIDATE: ICD-10-CM

## 2018-05-01 PROCEDURE — 86832 HLA CLASS I HIGH DEFIN QUAL: CPT | Mod: PO,TXP

## 2018-05-01 PROCEDURE — 86833 HLA CLASS II HIGH DEFIN QUAL: CPT | Mod: PO,TXP

## 2018-05-16 LAB — HPRA INTERPRETATION: NORMAL

## 2018-05-23 LAB
CLASS I ANTIBODIES - LUMINEX: NORMAL
CLASS I ANTIBODY COMMENTS - LUMINEX: NORMAL
CLASS II ANTIBODIES - LUMINEX: NORMAL
CLASS II ANTIBODY COMMENTS - LUMINEX: NORMAL
CPRA %: 92
SERUM COLLECTION DT - LUMINEX CLASS I: NORMAL
SERUM COLLECTION DT - LUMINEX CLASS II: NORMAL
SPCL1 TESTING DATE: NORMAL
SPCL2 TESTING DATE: NORMAL
SPCLU TESTING DATE: NORMAL

## 2018-06-26 ENCOUNTER — TELEPHONE (OUTPATIENT)
Dept: TRANSPLANT | Facility: CLINIC | Age: 53
End: 2018-06-26

## 2018-07-02 DIAGNOSIS — Z76.82 AWAITING ORGAN TRANSPLANT STATUS: Primary | ICD-10-CM

## 2018-10-10 DIAGNOSIS — Z76.82 ORGAN TRANSPLANT CANDIDATE: ICD-10-CM

## 2019-03-22 NOTE — PROGRESS NOTES
REFERRING PROVIDER  Aaareferral Self  No address on file  Subjective:   Patient: Erasto Low [id number], :1965   Visit date:3/26/2019 11:21 AM    Chief Complaint:  No chief complaint on file.    HPI:     Erasto Low is a 53 y.o. male whom I am asked to see for evaluation of diminished hearing in both ears for the past 3 weeks. There is a prior history of cerumen impaction.    His meds, allergies, medical, surgical, social & family histories were reviewed & updated:  -     He has a current medication list which includes the following prescription(s): acetaminophen, amlodipine, amlodipine, clonazepam, clonazepam, levofloxacin, multivitamin, multivitamin with folic acid, and velphoro.  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   -     He does not have any pertinent problems on file.   -     He  has a past surgical history that includes Kidney transplant; Kidney transplant; Hip Arthroplasty; Total shoulder arthroplasty; Nephrectomy (); AV fistula placement; Parathyroidectomy; Hernia repair; Lung removal, partial; and Kidney surgery.  -     He  reports that  has never smoked. His smokeless tobacco use includes snuff. He reports that he drinks alcohol. He reports that he does not use drugs.  -     His family history includes Hypertension in his brother, father, maternal uncle, and mother; Kidney disease in his brother, maternal uncle, and mother.  -     He is allergic to ether; cyclosporine; and ether.      Review of Systems:  -     Allergic/Immunologic: is allergic to ether; cyclosporine; and ether..  -     Constitutional: Current temp:          Objective:     Physical Exam:  Vitals:  There  were no vitals taken for this visit.  Communication:  Able to communicate, no hoarseness.  Head & Face:  Normocephalic, atraumatic, no sinus tenderness.  Eyes:  Extraocular motions intact.  Ears:  Otoscopy of external auditory canals reveals impaction of bilateral ear canals.  With the patient in the supine position, we used the operating microscope to examine both ears with the appropriate sized ear speculum.  A variety of sterile, micro-instruments were utilized to remove the cerumen atraumatically from the impacted ear(s).   After removal, the ears were reexamined-  Right Ear:  No mass/lesion of auricle. The external auditory canals is without erythema or discharge. Pneumatic otoscopy of the tympanic membrane revealed no perforation and good mobility, with no fluid in middle ear. Clinical speech reception thresholds grossly normal.  Left Ear:  No mass/lesion of auricle. The external auditory canals is without erythema or discharge. Pneumatic otoscopy of the tympanic membrane revealed no perforation and good mobility, with no fluid in middle ear. Clinical speech reception thresholds grossly normal  Nose:  No masses/lesions of external nose, nasal mucosa, septum, and turbinates were within normal limits.  Mouth:  No mass/lesion of lips, teeth, gums, hard/soft palate, tongue, tonsils, or oropharynx.  Neck & Lymphatics:  No cervical lymphadenopathy, no neck mass/crepitus/ asymmetry, trachea is midline, no thyroid enlargement/tenderness/mass.  Neuro/Psych: Alert with normal mood and affect.   Respiration/Chest:  Symmetric expansion during respiration, normal respiratory effort.  Skin:  Warm and intact.    Assessment & Plan:       -     Cerumen Impaction - Erasto has cerumen impaction.  Impaction was remoed without difficulty and the patient tolerated this well. We discussed preventative measures and treatment options.  Q-tips must be avoided, instead the ears can be cleaned with OTC ear rinses (or a mixture of alcohol &  vinegar in equal parts).   For hard wax, Erasto may place mineral oil/baby oil in the ear with a cotton ball at night and remove in the shower.  This will assist in softening the wax and allow it to drain out on its own. If the cerumen impacts the ear canal and causes hearing loss or infection he needs to follow-up in the clinic for treatment and cleaning.

## 2019-03-26 ENCOUNTER — OFFICE VISIT (OUTPATIENT)
Dept: OTOLARYNGOLOGY | Facility: CLINIC | Age: 54
End: 2019-03-26
Payer: MEDICARE

## 2019-03-26 VITALS
SYSTOLIC BLOOD PRESSURE: 134 MMHG | BODY MASS INDEX: 25.35 KG/M2 | HEART RATE: 64 BPM | DIASTOLIC BLOOD PRESSURE: 82 MMHG | WEIGHT: 174.19 LBS

## 2019-03-26 DIAGNOSIS — H61.23 BILATERAL IMPACTED CERUMEN: Primary | ICD-10-CM

## 2019-03-26 PROCEDURE — 99999 PR PBB SHADOW E&M-EST. PATIENT-LVL III: ICD-10-PCS | Mod: PBBFAC,TXP,, | Performed by: OTOLARYNGOLOGY

## 2019-03-26 PROCEDURE — 99213 OFFICE O/P EST LOW 20 MIN: CPT | Mod: S$PBB,TXP,, | Performed by: OTOLARYNGOLOGY

## 2019-03-26 PROCEDURE — 99213 PR OFFICE/OUTPT VISIT, EST, LEVL III, 20-29 MIN: ICD-10-PCS | Mod: S$PBB,TXP,, | Performed by: OTOLARYNGOLOGY

## 2019-03-26 PROCEDURE — 99999 PR PBB SHADOW E&M-EST. PATIENT-LVL III: CPT | Mod: PBBFAC,TXP,, | Performed by: OTOLARYNGOLOGY

## 2019-03-26 PROCEDURE — 99213 OFFICE O/P EST LOW 20 MIN: CPT | Mod: PBBFAC,TXP | Performed by: OTOLARYNGOLOGY

## 2019-11-11 DIAGNOSIS — Z76.82 ORGAN TRANSPLANT CANDIDATE: Primary | ICD-10-CM

## 2019-11-11 DIAGNOSIS — I13.11 HYPERTENSIVE HEART DISEASE WITH END STAGE CHRONIC KIDNEY DISEASE ON DIALYSIS, UNSPECIFIED WHETHER HEART FAILURE PRESENT: ICD-10-CM

## 2019-11-11 DIAGNOSIS — N18.6 HYPERTENSIVE HEART DISEASE WITH END STAGE CHRONIC KIDNEY DISEASE ON DIALYSIS, UNSPECIFIED WHETHER HEART FAILURE PRESENT: ICD-10-CM

## 2019-11-11 DIAGNOSIS — Z99.2 HYPERTENSIVE HEART DISEASE WITH END STAGE CHRONIC KIDNEY DISEASE ON DIALYSIS, UNSPECIFIED WHETHER HEART FAILURE PRESENT: ICD-10-CM

## 2019-11-12 ENCOUNTER — TELEPHONE (OUTPATIENT)
Dept: ENDOSCOPY | Facility: HOSPITAL | Age: 54
End: 2019-11-12

## 2019-11-13 ENCOUNTER — TELEPHONE (OUTPATIENT)
Dept: ENDOSCOPY | Facility: HOSPITAL | Age: 54
End: 2019-11-13

## 2019-11-13 NOTE — TELEPHONE ENCOUNTER
Attempted to call pt to schedule Endoscopy procedure. No answer. Left a message to return our call.

## 2019-11-18 ENCOUNTER — TELEPHONE (OUTPATIENT)
Dept: TRANSPLANT | Facility: CLINIC | Age: 54
End: 2019-11-18

## 2019-12-31 ENCOUNTER — TELEPHONE (OUTPATIENT)
Dept: RADIOLOGY | Facility: HOSPITAL | Age: 54
End: 2019-12-31

## 2020-01-23 ENCOUNTER — HOSPITAL ENCOUNTER (OUTPATIENT)
Dept: PULMONOLOGY | Facility: HOSPITAL | Age: 55
Discharge: HOME | End: 2020-01-23
Attending: NURSE PRACTITIONER
Payer: MEDICARE

## 2020-01-23 ENCOUNTER — HOSPITAL ENCOUNTER (OUTPATIENT)
Dept: RADIOLOGY | Facility: HOSPITAL | Age: 55
Discharge: HOME | End: 2020-01-23
Attending: NURSE PRACTITIONER
Payer: MEDICARE

## 2020-01-23 ENCOUNTER — HOSPITAL ENCOUNTER (OUTPATIENT)
Dept: CARDIOLOGY | Facility: HOSPITAL | Age: 55
Discharge: HOME | End: 2020-01-23
Attending: NURSE PRACTITIONER
Payer: MEDICARE

## 2020-01-23 DIAGNOSIS — I51.7 CARDIOMEGALY: ICD-10-CM

## 2020-01-23 DIAGNOSIS — I13.11 HYPERTENSIVE HEART DISEASE WITH END STAGE CHRONIC KIDNEY DISEASE ON DIALYSIS, UNSPECIFIED WHETHER HEART FAILURE PRESENT: ICD-10-CM

## 2020-01-23 DIAGNOSIS — Z99.2 HYPERTENSIVE HEART DISEASE WITH END STAGE CHRONIC KIDNEY DISEASE ON DIALYSIS, UNSPECIFIED WHETHER HEART FAILURE PRESENT: ICD-10-CM

## 2020-01-23 DIAGNOSIS — Z76.82 ORGAN TRANSPLANT CANDIDATE: ICD-10-CM

## 2020-01-23 DIAGNOSIS — N18.6 HYPERTENSIVE HEART DISEASE WITH END STAGE CHRONIC KIDNEY DISEASE ON DIALYSIS, UNSPECIFIED WHETHER HEART FAILURE PRESENT: ICD-10-CM

## 2020-01-23 LAB
AORTIC VALVE REGURGITATION: ABNORMAL
DIASTOLIC DYSFUNCTION: NO
ESTIMATED PA SYSTOLIC PRESSURE: 41
MITRAL VALVE REGURGITATION: ABNORMAL
RETIRED EF AND QEF - SEE NOTES: 60 (ref 55–65)
TRICUSPID VALVE REGURGITATION: ABNORMAL

## 2020-01-23 PROCEDURE — 93306 TTE W/DOPPLER COMPLETE: CPT | Mod: 26,TXP,, | Performed by: INTERNAL MEDICINE

## 2020-01-23 PROCEDURE — A9502 TC99M TETROFOSMIN: HCPCS | Mod: TXP

## 2020-01-23 PROCEDURE — 93018 NM MULTI PHARM STRESS CARDIAC COMPONENT: ICD-10-PCS | Mod: TXP,,, | Performed by: INTERNAL MEDICINE

## 2020-01-23 PROCEDURE — 93017 CV STRESS TEST TRACING ONLY: CPT | Mod: TXP

## 2020-01-23 PROCEDURE — 71046 X-RAY EXAM CHEST 2 VIEWS: CPT | Mod: TC,TXP

## 2020-01-23 PROCEDURE — 78452 HT MUSCLE IMAGE SPECT MULT: CPT | Mod: 26,TXP,, | Performed by: INTERNAL MEDICINE

## 2020-01-23 PROCEDURE — 93018 CV STRESS TEST I&R ONLY: CPT | Mod: TXP,,, | Performed by: INTERNAL MEDICINE

## 2020-01-23 PROCEDURE — 63600175 PHARM REV CODE 636 W HCPCS: Mod: TXP | Performed by: PHYSICIAN ASSISTANT

## 2020-01-23 PROCEDURE — 78452 NM MULTI PHARM STRESS CARDIAC COMPONENT: ICD-10-PCS | Mod: 26,TXP,, | Performed by: INTERNAL MEDICINE

## 2020-01-23 PROCEDURE — 93306 2D ECHO WITH COLOR FLOW DOPPLER: ICD-10-PCS | Mod: 26,TXP,, | Performed by: INTERNAL MEDICINE

## 2020-01-23 PROCEDURE — 72170 X-RAY EXAM OF PELVIS: CPT | Mod: TC,TXP

## 2020-01-23 PROCEDURE — 93306 TTE W/DOPPLER COMPLETE: CPT | Mod: TXP

## 2020-01-23 PROCEDURE — 93016 NM MULTI PHARM STRESS CARDIAC COMPONENT: ICD-10-PCS | Mod: TXP,,, | Performed by: INTERNAL MEDICINE

## 2020-01-23 PROCEDURE — 93016 CV STRESS TEST SUPVJ ONLY: CPT | Mod: TXP,,, | Performed by: INTERNAL MEDICINE

## 2020-01-23 RX ORDER — REGADENOSON 0.08 MG/ML
0.4 INJECTION, SOLUTION INTRAVENOUS ONCE
Status: COMPLETED | OUTPATIENT
Start: 2020-01-23 | End: 2020-01-23

## 2020-01-23 RX ADMIN — REGADENOSON 0.4 MG: 0.08 INJECTION, SOLUTION INTRAVENOUS at 02:01

## 2020-02-10 NOTE — PROGRESS NOTES
Iliac calcifications are present; prior Txp is present.      Need noncon CT pelvis and iliac doppler US.

## 2020-03-04 DIAGNOSIS — R93.89 ABNORMAL FINDINGS ON DIAGNOSTIC IMAGING OF OTHER SPECIFIED BODY STRUCTURES: ICD-10-CM

## 2020-03-04 DIAGNOSIS — Z76.82 ORGAN TRANSPLANT CANDIDATE: Primary | ICD-10-CM

## 2020-03-05 ENCOUNTER — HOSPITAL ENCOUNTER (OUTPATIENT)
Dept: RADIOLOGY | Facility: HOSPITAL | Age: 55
Discharge: HOME | End: 2020-03-05
Attending: NURSE PRACTITIONER
Payer: MEDICARE

## 2020-03-05 ENCOUNTER — OFFICE VISIT (OUTPATIENT)
Dept: TRANSPLANT | Facility: CLINIC | Age: 55
End: 2020-03-05
Payer: MEDICARE

## 2020-03-05 VITALS
HEART RATE: 64 BPM | DIASTOLIC BLOOD PRESSURE: 78 MMHG | BODY MASS INDEX: 26.1 KG/M2 | SYSTOLIC BLOOD PRESSURE: 149 MMHG | HEIGHT: 68 IN | OXYGEN SATURATION: 100 % | TEMPERATURE: 98 F | WEIGHT: 172.19 LBS

## 2020-03-05 DIAGNOSIS — T86.12 FAILED KIDNEY TRANSPLANT: ICD-10-CM

## 2020-03-05 DIAGNOSIS — Z76.82 AWAITING ORGAN TRANSPLANT: ICD-10-CM

## 2020-03-05 DIAGNOSIS — Z76.82 ORGAN TRANSPLANT CANDIDATE: ICD-10-CM

## 2020-03-05 DIAGNOSIS — N18.6 ESRD (END STAGE RENAL DISEASE): Primary | Chronic | ICD-10-CM

## 2020-03-05 DIAGNOSIS — Q87.81 ALPORT SYNDROME: Chronic | ICD-10-CM

## 2020-03-05 DIAGNOSIS — I12.9 RENAL HYPERTENSION: Chronic | ICD-10-CM

## 2020-03-05 PROCEDURE — 93978 VASCULAR STUDY: CPT | Mod: TC,TXP

## 2020-03-05 PROCEDURE — 76770 US EXAM ABDO BACK WALL COMP: CPT | Mod: TC,TXP

## 2020-03-05 PROCEDURE — 76776 US EXAM K TRANSPL W/DOPPLER: CPT | Mod: TC,TXP

## 2020-03-05 PROCEDURE — 76770 US EXAM ABDO BACK WALL COMP: CPT | Mod: 26,TXP,, | Performed by: RADIOLOGY

## 2020-03-05 PROCEDURE — 76770 US RETROPERITONEAL COMPLETE: ICD-10-PCS | Mod: 26,TXP,, | Performed by: RADIOLOGY

## 2020-03-05 PROCEDURE — 99215 OFFICE O/P EST HI 40 MIN: CPT | Mod: S$PBB,TXP,, | Performed by: INTERNAL MEDICINE

## 2020-03-05 PROCEDURE — 99999 PR PBB SHADOW E&M-EST. PATIENT-LVL IV: CPT | Mod: PBBFAC,TXP,, | Performed by: INTERNAL MEDICINE

## 2020-03-05 PROCEDURE — 99214 OFFICE O/P EST MOD 30 MIN: CPT | Mod: PBBFAC,25,TXP | Performed by: INTERNAL MEDICINE

## 2020-03-05 PROCEDURE — 93978 VASCULAR STUDY: CPT | Mod: 26,TXP,, | Performed by: RADIOLOGY

## 2020-03-05 PROCEDURE — 76776 US EXAM K TRANSPL W/DOPPLER: CPT | Mod: 26,TXP,, | Performed by: RADIOLOGY

## 2020-03-05 PROCEDURE — 99215 PR OFFICE/OUTPT VISIT, EST, LEVL V, 40-54 MIN: ICD-10-PCS | Mod: S$PBB,TXP,, | Performed by: INTERNAL MEDICINE

## 2020-03-05 PROCEDURE — 76776 US TRANSPLANT KIDNEY WITH DOPPLER: ICD-10-PCS | Mod: 26,TXP,, | Performed by: RADIOLOGY

## 2020-03-05 PROCEDURE — 99999 PR PBB SHADOW E&M-EST. PATIENT-LVL IV: ICD-10-PCS | Mod: PBBFAC,TXP,, | Performed by: INTERNAL MEDICINE

## 2020-03-05 PROCEDURE — 93978 US DOPP ILIACS BILATERAL: ICD-10-PCS | Mod: 26,TXP,, | Performed by: RADIOLOGY

## 2020-03-05 NOTE — PROGRESS NOTES
Kidney Transplant Recipient Reevalulation    Referring Physician:   Current Nephrologist: Markel Roman  Waitlist Status: inactive  Dialysis Start Date: 7/5/2005    Subjective:     CC:  Annual reassessment of kidney transplant candidacy.    HPI:  Mr. Low is a 54 y.o. year old White male with ESRD secondary to failed kidney transplant.  He has been on the wait list for a kidney transplant at [facility] since 5/2/2003. Patient is currently on hemodialysis started on 2003. Patient is dialyzing on MWF schedule.  Patient reports that he is tolerating dialysis well.. He has a LUE AV fistula. Patient denies any recent hospitalizations or ED visits.      Patient has the left native kidney and the 2 allografts in place. He is s/p right native nephrectomy     He denied any admission or ER visit.    He stays very active playing with his dogs, house work, cleaning the house.     He walks daily. He feels tired denied SOB.    BP remains stable on dialysis.     He is here with the care givers    NO LIVING DONORS FOR THE KIDNEY TRANSPLANT     Acceptable functional capacity  And frailty index. He looks older than the states age.             Current Outpatient Medications on File Prior to Visit   Medication Sig Dispense Refill    acetaminophen (TYLENOL) 650 MG TbSR Take 650 mg by mouth.      amLODIPine (NORVASC) 5 MG tablet Take 5 mg by mouth.      clonazePAM (KLONOPIN) 0.5 MG tablet Take 0.5 mg by mouth.      multivitamin with folic acid 400 mcg Tab Take 1 tablet by mouth.      VELPHORO 500 mg Chew CHEW AND SWALLOW TWO TABLETS THREE TIMES DAILY TAKE WITH FOOD  1    amlodipine (NORVASC) 5 MG tablet Take 5 mg by mouth once daily.      clonazepam (KLONOPIN) 0.5 MG tablet Take 0.5 mg by mouth Once daily as needed. 1 tablet Oral      multivitamin capsule Take 1 capsule by mouth once daily.       No current facility-administered medications on file prior to visit.         Review of Systems    Skin: no skin rash  CNS; no headaches,  "blurred vision, seizure, or syncope  ENT: No JVD,  Adenopathies,  nasal congestion. No oral lesions  Cardiac: No chest pain, dyspnea, claudication, edema or palpitations  Respiratory: No SOB, cough, hemoptysis   Gastro-intestinal: No diarrhea, constipation, abdominal pain, nausea, vomit. No ascitis  Genitourinary: no hematuria, dysuria, frequency, frequency  Musculoskeletal: joint pain, arthritis or vasculitic changes  Psych: alert awake, oriented, No cranial nerves deficit.      Objective:   body mass index is 26.1 kg/m².    Physical Exam    BP (!) 149/78 (BP Location: Right arm, Patient Position: Sitting, BP Method: Medium (Automatic))   Pulse 64   Temp 98 °F (36.7 °C) (Oral)   Ht 5' 8.11" (1.73 m)   Wt 78.1 kg (172 lb 2.9 oz)   SpO2 100%   BMI 26.10 kg/m²       Head: normocephalic  Neck: No JVD, cervical axillary, or femoral adenopathies  Heart: no murmurs, Normal s1 and s2, No gallops, no rubs, No murmurs  Lungs; CTA, good respiratory effort, no crackles  Abdomen: soft, non tender, no splenomegaly or hepatomegaly, no massess, no bruits  Extremities: No edema, skin rash, joint pain. LUE AVF  SNC: awake, alert oriented. Cranial nerves are intact, no focalized, sensitivity and strength preserved      Labs:  Lab Results   Component Value Date    WBC 5.87 11/28/2017    HGB 11.4 (L) 11/28/2017    HCT 34.5 (L) 11/28/2017     11/28/2017    K 3.9 11/28/2017    CL 95 11/28/2017    CO2 29 11/28/2017    BUN 28 (H) 11/28/2017    CREATININE 8.0 (H) 11/28/2017    EGFRNONAA 6.9 (A) 11/28/2017    CALCIUM 9.4 11/28/2017    PHOS 4.1 04/10/2016    MG 2.5 04/10/2016    ALBUMIN 3.9 11/28/2017    AST 23 11/28/2017    ALT 26 11/28/2017    PTH 81.0 (H) 03/05/2020       No results found for: PREALBUMIN, BILIRUBINUA, GGT, AMYLASE, LIPASE, PROTEINUA, NITRITE, RBCUA, WBCUA    No results found for: HLAABCTYPE    Lab Results   Component Value Date    CPRA 82 02/05/2020    JA9YXCQ  02/05/2020     " B42,B67,B55,B7,B56,B81,B73,B41,B54,A31,B27,A30,Cw16,Cw17,B39,B60,B59,B38,B82,B48,B8,B61,B50    CIABCLM WEAK Cw8, B62, B49, B13, A29, B72 2020    CIIAB DQ6,DQA1*01:01,    ABCMT WEAK        Labs were reviewed with the patient.    Pre-transplant Workup:   Reviewed with the patient.    Assessment:     1. 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 Renal hypertension    3. 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 Alport syndrome    5. Awaiting organ transplant        Plan:     Transplant Candidacy:   Mr. Low will be a suitable kidney transplant candidate once he undergoes follow up colonoscopy and we obtain clearance letter from urology .  Meets center eligibility for accepting HCV+ donor offer - yes.  Patient educated on HCV+ donors. Erasto is willing  to accept HCV+ donor offer -  yes   Patient is a candidate for KDPI > 85 kidney donor offer - yes.  He remains in overall stable health, and will remain active on the transplant list.    Will need a colonoscopy    PSA reviewed and was acceptable.     Will obtain Urology clearance letter    Extensive education provided regarding PRA, dialysis time and retransplantation after 17 yrs of dialysis    We discussed challenges of subsequent kidney transplants including infections malignancies     We spoke about possible offers after he is active in the waiting list    All questions answered     Gunnar Fernando MD       Follow-up:   In addition to the tests noted in the plan, Mr. Low will continue to have reevaluation as per the standing pre-kidney transplant protocol:  1. Monthly blood for PRA  2. Annual return to clinic, except HIV positive, > 65 years of age, or pancreas transplant candidates who will be scheduled to see transplant every 6 months while in pre-transplant phase  3. Annual re-testing: CXR, EKG, yearly mammograms for women over 40 and PSA for males over 40,  cardiology follow-up as recommended by initial cardiology pre-transplant evaluation  4. Renal ultrasound every 2 years  5. Baseline colonoscopy after age 50 and repeated as recommended    UNOS Patient Status  Functional Status: 60% - Requires occasional assistance but is able to care for needs  Physical Capacity: No Limitations

## 2020-03-05 NOTE — PROGRESS NOTES
PHARM.D. PRE-TRANSPLANT NOTE:     This patient's medication therapy was evaluated as part of his pre-transplant evaluation.      The following general pharmacologic concerns were noted: Klonopin should be resumed to prevent withdrawal;     The following pharmacologic concerns related to HCV therapy were noted: N/A      This patient's medication profile was reviewed for considerations for DAA Hepatitis C therapy:    [x]  No current inducers of CYP 3A4 or PGP  [x]  No amiodarone on this patient's EMR profile in the last 24 months  [x]  No past or current atrial fibrillation on this patient's EMR profile       Current Outpatient Medications   Medication Sig Dispense Refill    acetaminophen (TYLENOL) 650 MG TbSR Take 650 mg by mouth.      amLODIPine (NORVASC) 5 MG tablet Take 5 mg by mouth.      clonazePAM (KLONOPIN) 0.5 MG tablet Take 0.5 mg by mouth.      multivitamin with folic acid 400 mcg Tab Take 1 tablet by mouth.      VELPHORO 500 mg Chew CHEW AND SWALLOW TWO TABLETS THREE TIMES DAILY TAKE WITH FOOD  1    amlodipine (NORVASC) 5 MG tablet Take 5 mg by mouth once daily.      clonazepam (KLONOPIN) 0.5 MG tablet Take 0.5 mg by mouth Once daily as needed. 1 tablet Oral      multivitamin capsule Take 1 capsule by mouth once daily.       No current facility-administered medications for this visit.          Currently Mr. Low is responsible for preparing / administering this patient's medications on a daily basis.  I am available for consultation and can be contacted, as needed by the other members of the Kidney Transplant team.

## 2020-03-05 NOTE — LETTER
March 9, 2020        Markel Roman  [address]  [address]  [facility]   [place] [address]  Phone: [phone]  Fax: [phone]             [facility] Transplant  [address]  [facility] [address]  Phone: [phone]   Patient: Erasto Low   MR Number: [id number]   YOB: 1965   Date of Visit: 3/5/2020       Dear Dr. Markel Roman    Thank you for referring Erasto Low to me for evaluation. Attached you will find relevant portions of my assessment and plan of care.    If you have questions, please do not hesitate to call me. I look forward to following Erasto Low along with you.    Sincerely,    Gunnar Fernando MD    Enclosure    If you would like to receive this communication electronically, please contact [email or web address] or [phone] to request [employer] Link access.    [employer] Link is a tool which provides read-only access to select patient information with whom you have a relationship. Its easy to use and provides real time access to review your patients record including encounter summaries, notes, results, and demographic information.    If you feel you have received this communication in error or would no longer like to receive these types of communications, please e-mail [email or web address]

## 2020-03-09 NOTE — PROGRESS NOTES
Transplant Recipient Adult Psychosocial Assessment  Update from 7/27/2017    Erasto Low  [address] [place] [address]  Telephone Information:   Mobile [phone]   Home  [phone] (home)  Work  There is no work phone number on file.  E-mail  [email or web address]    Sex: male  YOB: 1965  Age: 54 y.o.    Encounter Date: 3/5/2020  [place] Citizen: yes  Primary Language: English   Needed: no    Emergency Contact:  Name: Nerissa Pathak  Relationship: mother  Address: [address] [place] [address]  Phone Numbers:  [phone] (mobile)    Family/Social Support:   Number of dependents/: Pt denies any minors.  Marital history: Pt denies any marriages or S.O.  Other family dynamics: Pt reported his mother, step-father and three brothers are very supportive. Two brothers live in [place] while the third brother lives in [place]. Pt has been transplanted x2 at [facility] in 1984 and 2000. Pt's mother had a kidney transplant from [facility] in 2009.     Household Composition:    Pt lives alone.    Do you and your caregivers have access to reliable transportation? yes  PRIMARY CAREGIVER: Nerissa Pathak, pt's mother, will be primary caregiver, phone number [phone].      provided in-depth information to patient and caregiver regarding pre- and post-transplant caregiver role.   strongly encourages patient and caregiver to have concrete plan regarding post-transplant care giving, including back-up caregiver(s) to ensure care giving needs are met as needed.    Patient and Caregiver states understanding all aspects of caregiver role/commitment and is able/willing/committed to being caregiver to the fullest extent necessary.    Patient and Caregiver verbalizes understanding of the education provided today and caregiver responsibilities.         remains available. Patient and Caregiver agree to contact  in a timely  manner if concerns arise.      Able to take time off work without financial concerns: yes.     Additional Significant Others who will Assist with Transplant:  Name: Nick Pathak- [phone].   Age: 72  City: [place] State: [place]  Relationship: father  Does person drive? yes    Name: Abelino Pathak # [phone]  Age: 40  City: [place] State: [place]  Relationship: brother  Does person drive? yes    Name: Brad Low # [phone]  City: [place] State: [place]  Relationship: brother  Does person drive? yes    Living Will: no  Healthcare Power of : no  Advance Directives on file: <<no information> per medical record.  Verbally reviewed LW/HCPA information.   provided patient with copy of LW/HCPA documents and provided education on completion of forms.    Living Donors: No. Pt reports already looking into potential living donors.    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   Learns Best By:  Pt learns best by a combination of verbal, written and hands on demonstration.      Status: no  VA Benefits: no     Working for Income: No  If no, reason not working: Disability     Spouse/Significant Other Employment: N/A    Disabled: yes: date disability began: 1984, due to: ESRD. Pt has been transplanted x2 at [facility] in 1984 and 2000.    Monthly Income:  SS Disability: $575  Able to afford all costs now and if transplanted, including medications: yes. Pt reported he does not have any trouble affording medications at this time. Pt reports parents and brothers can assist as needed.   Patient and Caregiver verbalizes understanding of personal responsibilities related to transplant costs and the importance of having a financial plan to ensure that patients transplant costs are fully covered.      provided fundraising information/education.  Patient and Caregiver  verbalizes understanding.   remains available.    Insurance:   Payor/Plan Subscr  Sex Relation Sub. Ins. ID Effective Group Num   1. MEDICARE - ME* ADRIEL RUBY 1965 Male  [id number] 1986                                    [address]   2. AETNA - AETNA* ADRIEL RUBY 1965 Male  [id number]                                   [address], [place] [address]     Primary Insurance (for UNOS reporting): Public Insurance - Medicare FFS (Fee For Service)  Secondary Insurance (for UNOS reporting): Private Insurance through Step-father's employer ([employer]).   Patient and Caregiver verbalizes clear understanding that patient may experience difficulty obtaining and/or be denied insurance coverage post-surgery. This includes and is not limited to disability insurance, life insurance, health insurance, burial insurance, long term care insurance, and other insurances.    Patient and Caregiver also reports understanding that future health concerns related to or unrelated to transplantation may not be covered by patient's insurance.  Resources and information provided and reviewed.      Patient and Caregiver provides verbal permission to release any necessary information to outside resources for patient care and discharge planning.  Resources and information provided are reviewed.      Dialysis Adherence:  Patient and Caregiver reports high adherence.   Trudi Llanes, nurse at pt's dialysis unit reports over the last three months that patient has had 0 AMAs, 0 no shows and no issues with caregiver support, transportation or medication management.     Infusion Service: patient utilizing? no  Home Health: patient utilizing? no  DME: no  Pulmonary/Cardiac Rehab: no   ADLS:  Pt reported he is fully independent and still driving.     Adherence: Pt reports high compliance. Adherence education and counseling provided.     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 10/84-     Failed kidney transplant #2  donor 2001-2005     HTN (hypertension) 2014    Osteoporosis, unspecified     Renal dialysis status(V45.11)     Renal hypertension      Social History     Tobacco Use    Smoking status: Never Smoker    Smokeless tobacco: Current User     Types: Snuff   Substance Use Topics    Alcohol use: Yes     Comment: rarely     Social History     Substance and Sexual Activity   Drug Use No     Social History     Substance and Sexual Activity   Sexual Activity Not on file       Per Today's Psychosocial:  Tobacco: Patient reports "dipping" 1 can of tobacco per day.  Patient does not plan to quit.  Alcohol: Pt reports drinking on very special ocassions.  Illicit Drugs/Non-prescribed Medications: none, patient denies any use.    Patient and Caregiver states clear understanding of the potential impact of substance use as it relates to transplant candidacy and is aware of possible random substance screening.  Substance abstinence/cessation counseling, education and resources provided and reviewed.     Arrests/DWI/Treatment/Rehab: patient denies    Psychiatric History:    Mental Health: pt denied any mental health issues at this time.   Psychiatrist/Counselor: none  Medications:  none  Suicide/Homicide Issues: pt denied past/current SI/HI   Safety at home: Pt denies any history of any abuse.     Knowledge: Patient and Caregiver states having clear understanding and realistic expectations regarding the potential risks and potential benefits of organ transplantation and organ donation, agrees to discuss with health care team members and support system members and to utilize available resources and express questions and/or concerns in order to further facilitate the pt informed decision-making.  Resources and " information provided and reviewed.     Patient and Caregiver is aware of [facility]'s affiliation and/or partnership with agencies in home health care, LTAC, SNF, [facility] and other hospitals and clinics.    Understanding: Patient and Caregiver reports having a clear understanding of the many lifetime commitments involved with being a transplant recipient, including costs, compliance, medications, lab work, procedures, appointments, concrete and financial planning, preparedness, timely and appropriate communication of concerns, abstinence (ETOH, tobacco, illicit non-prescribed drugs), adherence to all health care team recommendations, support system and caregiver involvement, appropriate and timely resource utilization and follow-through, mental health counseling as needed/recommended, and patient and caregiver responsibilities.  Social Service Handbook, resources and detailed educational information provided and reviewed.  Educational information provided.    Patient and Caregiver also reports current and expected compliance with health care regime and states having a clear understanding of the importance of compliance.      Patient and Caregiver reports a clear understanding that risks and benefits may be involved with organ transplantation and with organ donation.      Patient and Caregiver also reports clear understanding that psychosocial risk factors may affect patient, and include but are not limited to feelings of depression, generalized anxiety, anxiety regarding dependence on others, post traumatic stress disorder, feelings of guilt and other emotional and/or mental concerns, and/or exacerbation of existing mental health concerns.  Detailed resources provided and discussed.     Patient and Caregiver agrees to access appropriate resources in a timely manner as needed and/or as recommended, and to communicate concerns appropriately.  Patient and Caregiver also reports a clear understanding of treatment options  available.      reviewed education, provided additional information, and answered questions.    Feelings or Concerns: Pt denied any concerns. Pt is highly motivated for transplant.     Coping: Pt reports coping well with hobbies like fishing, hunting and training dogs.      Goals: Pt reports wanting to travel post transplant.  Patient referred to Vocational Rehabilitation.    Interview Behavior: Patient and Caregiver presents as alert and oriented x 4, pleasant, good eye contact, well groomed, recall good, concentration/judgement good, average intelligence, calm, communicative, cooperative and asking and answering questions appropriately. Pt presents with parents, per pt's request.         Transplant Social Work - Candidacy  Assessment/Plan:     Psychosocial Suitability: Patient presents as a suitable candidate for kidney transplant at this time. Based on psychosocial risk factors, patient presents as low risk, due to good social support, adequate caregiver plan with multiple committed caregivers, hx of x2 kidney transplant, multiple family members with hx of kidney transplant, no reported alcohol/drug abuse, and no reported mental health issues.       Recommendations/Additional Comments: SW recommends pt conduct fundraising to assist in the transplant process. SW recommends that pt remain aware of potential mental health concerns and contact the team if any concerns arise. SW recommends that pt remain abstinent from tobacco, ETOH and drug use. SW support pt's continued dialysis adherence. SW remains available to answer any questions or concerns that arise as the pt moves through the transplant process.     Laura Friend LMSW

## 2020-03-19 ENCOUNTER — HOSPITAL ENCOUNTER (OUTPATIENT)
Dept: RADIOLOGY | Facility: HOSPITAL | Age: 55
Discharge: HOME | End: 2020-03-19
Attending: NURSE PRACTITIONER
Payer: MEDICARE

## 2020-03-19 DIAGNOSIS — R93.89 ABNORMAL FINDINGS ON DIAGNOSTIC IMAGING OF OTHER SPECIFIED BODY STRUCTURES: ICD-10-CM

## 2020-03-19 PROCEDURE — 74176 CT ABD & PELVIS W/O CONTRAST: CPT | Mod: TC,TXP

## 2020-04-01 ENCOUNTER — TELEPHONE (OUTPATIENT)
Dept: ADMINISTRATIVE | Facility: HOSPITAL | Age: 55
End: 2020-04-01

## 2020-04-01 NOTE — TELEPHONE ENCOUNTER
Need updated PCP info. Last visit with Dr. Rubalcava was 11/2017. [facility] for a return call.

## 2020-04-03 NOTE — PROGRESS NOTES
2 prior kidney transplants on left and right.   Would need midline approach.  High risk. Not prohibitive

## 2020-06-03 ENCOUNTER — TELEPHONE (OUTPATIENT)
Dept: INTERNAL MEDICINE | Facility: CLINIC | Age: 55
End: 2020-06-03

## 2020-06-03 ENCOUNTER — TELEPHONE (OUTPATIENT)
Dept: ENDOSCOPY | Facility: HOSPITAL | Age: 55
End: 2020-06-03

## 2020-06-03 DIAGNOSIS — Z76.82 AWAITING ORGAN TRANSPLANT: Primary | ICD-10-CM

## 2020-06-03 NOTE — TELEPHONE ENCOUNTER
Pt called to schedule colonoscopy. Advised pt's caregiver(Nerissa) to have PCP send order for scheduling. [email or web address]

## 2020-06-03 NOTE — TELEPHONE ENCOUNTER
----- Message from Brittani Gaitan sent at 6/3/2020  2:33 PM CDT -----  Contact: Mother-Melva Multani is requesting call back in regards to questions about getting order for colonoscopy            Pls call back at [phone]

## 2020-06-04 ENCOUNTER — TELEPHONE (OUTPATIENT)
Dept: INTERNAL MEDICINE | Facility: CLINIC | Age: 55
End: 2020-06-04

## 2020-06-04 ENCOUNTER — TELEPHONE (OUTPATIENT)
Dept: ENDOSCOPY | Facility: HOSPITAL | Age: 55
End: 2020-06-04

## 2020-06-04 DIAGNOSIS — Z12.11 COLON CANCER SCREENING: Primary | ICD-10-CM

## 2020-06-04 DIAGNOSIS — Z12.11 ENCOUNTER FOR SCREENING COLONOSCOPY: ICD-10-CM

## 2020-06-04 DIAGNOSIS — Z12.11 SCREEN FOR COLON CANCER: Primary | ICD-10-CM

## 2020-06-04 RX ORDER — POLYETHYLENE GLYCOL 3350, SODIUM SULFATE ANHYDROUS, SODIUM BICARBONATE, SODIUM CHLORIDE, POTASSIUM CHLORIDE 236; 22.74; 6.74; 5.86; 2.97 G/4L; G/4L; G/4L; G/4L; G/4L
4 POWDER, FOR SOLUTION ORAL ONCE
Qty: 4000 ML | Refills: 0 | Status: SHIPPED | OUTPATIENT
Start: 2020-06-04 | End: 2020-06-04

## 2020-06-04 NOTE — TELEPHONE ENCOUNTER
----- Message from Gloria Cunningham sent at 6/4/2020  9:43 AM CDT -----  Contact: self  [phone]  Patient is returning a phone call.  Who left a message for the patient: Nader  Does patient know what this is regarding:  appointment  Comments:

## 2020-06-04 NOTE — TELEPHONE ENCOUNTER
COVID Screening     1. Have you had a fever in the last 7 days or have you used fever reducing medicines for a fever in the last 7 days?  no    2. Are you experiencing shortness of breath, cough, muscle aches, loss of taste or loss of smell?  no    3. Are you residing with anyone who has tested positive for Covid?  no    If answered yes to any of the above questions, the pt must be scheduled for an appointment with their PCP.    A message also needs to be sent to the endoscopist to ensure the patient gets rescheduled at a later date.     ENDO screening    1. Have you been admitted overnight to the hospital in the past 3 months? no   If yes, schedule an appointment with PCP before scheduling endoscopic procedure.     2. Have you had a stent placed in the last 12 months? no   If yes, for a screening visit, cancel and message the ordering provider.  The patient will need a new order when the time is appropriate.     3. Have you had a stroke or heart attack in the past 6 months? no   If yes, cancel and refer patient to ordering provider for clearance, also message ordering provider to inform.     4. Have you had any chest pain in the past 3 months? no   If yes, Have you been evaluated by your PCP and/or cardiologist and it was determined to not be heart related? not applicable   If No, Pt needs to be seen by PCP or Cardiologist .  Pt can be scheduled once clearance obtained by either of those providers.     5. Do you take prescription weight loss medications?  no   If yes, must stop for 2 weeks prior to procedure.     6. Have you been diagnosed with diverticulitis within the past 3 months? no   If yes, must have been seen by GI within the last 3 months, if not schedule with GI BRITANY.    If pt has been seen by GI, schedule procedure 8-12 weeks post antibiotic treatment.     7. Are you on Dialysis? yes  If yes, schedule procedure for the day AFTER dialysis.  Appt time should be 9am or later, patient arrival time is 2 hours  "prior.  Nulytely or    miralax prep for all patients with kidney disease.     8. Are you diabetic?  no   If yes, schedule morning appt. Advise pt to hold all diabetic meds day of procedure.     9. If pt is older than 80 years of age and HAS NOT been seen by GI or PCP within the last 6 months, needs appt with GI BRITANY.   If pt has been seen by the GI provider or PCP within the past 6  months AND meets criteria, schedule procedure AND send message to the endoscopist.     10. Is patient on a "high risk" medication (blood thinner/antiplatelet agent)?  no   If yes, has cardiac clearance been obtained within the last 60 days? N/A   If no, a new clearance needs to be obtained.       I have reviewed the last colonoscopy for recommendations regarding next procedure bowel prep.  yes  I have reviewed medications and allergies.  yes  I have verified the pharmacy information and appropriate prep sent if needed. yes  Prep instructions have been mailed or sent to portal per patient request. yes    If answers yes to any of the following, schedule at [facility] ONLY. If No, OK for either location.     Is BMI over 45?   Any complaints of chest pain, new onset or at rest?  Does pt have an AICD?  Is there a diagnosis of heart failure?  Does patient have an insulin pump?  If procedure for esophageal banding?  "

## 2020-06-09 ENCOUNTER — TELEPHONE (OUTPATIENT)
Dept: INTERNAL MEDICINE | Facility: CLINIC | Age: 55
End: 2020-06-09

## 2020-06-09 ENCOUNTER — TELEPHONE (OUTPATIENT)
Dept: ENDOSCOPY | Facility: HOSPITAL | Age: 55
End: 2020-06-09

## 2020-06-09 NOTE — TELEPHONE ENCOUNTER
----- Message from Honey Connor sent at 6/9/2020  2:03 PM CDT -----  Contact: self  Pt is requesting a call back in regards to the scheduled procedures he has coming up on 06/25/2020. Please call back [phone]  Thanks.

## 2020-07-14 ENCOUNTER — TELEPHONE (OUTPATIENT)
Dept: FAMILY MEDICINE | Facility: CLINIC | Age: 55
End: 2020-07-14

## 2020-07-14 ENCOUNTER — TELEPHONE (OUTPATIENT)
Dept: ENDOSCOPY | Facility: HOSPITAL | Age: 55
End: 2020-07-14

## 2020-07-14 NOTE — TELEPHONE ENCOUNTER
----- Message from Mary Abrams sent at 7/14/2020 11:42 AM CDT -----  Pt is in need of instructions for procedure on 07/27. Please call back at [phone]

## 2020-07-21 ENCOUNTER — LAB VISIT (OUTPATIENT)
Dept: OTOLARYNGOLOGY | Facility: CLINIC | Age: 55
End: 2020-07-21
Payer: MEDICAID

## 2020-07-21 DIAGNOSIS — Z12.11 ENCOUNTER FOR SCREENING COLONOSCOPY: ICD-10-CM

## 2020-07-21 DIAGNOSIS — Z12.11 SCREEN FOR COLON CANCER: ICD-10-CM

## 2020-07-21 PROCEDURE — U0003 INFECTIOUS AGENT DETECTION BY NUCLEIC ACID (DNA OR RNA); SEVERE ACUTE RESPIRATORY SYNDROME CORONAVIRUS 2 (SARS-COV-2) (CORONAVIRUS DISEASE [COVID-19]), AMPLIFIED PROBE TECHNIQUE, MAKING USE OF HIGH THROUGHPUT TECHNOLOGIES AS DESCRIBED BY CMS-2020-01-R: HCPCS | Mod: NTX

## 2020-07-22 PROBLEM — Z86.010 HISTORY OF COLON POLYPS: Status: ACTIVE | Noted: 2020-07-22

## 2020-07-22 PROBLEM — Z86.0100 HISTORY OF COLON POLYPS: Status: ACTIVE | Noted: 2020-07-22

## 2020-07-23 ENCOUNTER — TELEPHONE (OUTPATIENT)
Dept: ENDOSCOPY | Facility: HOSPITAL | Age: 55
End: 2020-07-23

## 2020-07-23 ENCOUNTER — NURSE TRIAGE (OUTPATIENT)
Dept: ADMINISTRATIVE | Facility: CLINIC | Age: 55
End: 2020-07-23

## 2020-07-23 DIAGNOSIS — U07.1 COVID-19 VIRUS DETECTED: ICD-10-CM

## 2020-07-23 LAB — SARS-COV-2 RNA RESP QL NAA+PROBE: DETECTED

## 2020-07-23 NOTE — TELEPHONE ENCOUNTER
Pt father called and stated that [facility] [place] has attempted to contact Dr. Barroso office several times today to get COVID test results sent to the center so that they can schedule COVID specific dialysis for patient. Pt fathers states that he spoke with carmelina at [facility] [phone] center. Pt is not currently with pt at time of call.  Pt advise per protocol and verbalized understanding.     Reason for Disposition   [1] Caller is not with the adult (patient) AND [2] probable NON-URGENT symptoms    Additional Information   Negative: RN needs further essential information from caller in order to complete triage   Negative: Requesting regular office appointment   Negative: [1] Caller requesting NON-URGENT health information AND [2] PCP's office is the best resource   Negative: Question about upcoming scheduled test, no triage required and triager able to answer question   Negative: General information question, no triage required and triager able to answer question   Negative: Health Information question, no triage required and triager able to answer question    Protocols used: INFORMATION ONLY CALL-A-

## 2020-07-23 NOTE — PROGRESS NOTES
I attempted to call the patient and his next of kin.  I left a message about his positive COVID testing and the need to quarantine x 14 day on his message.  I also asked him to call us back.  I then called his next of kin and left a message to ask him to have the patient call us.  I have asked the Charge nurse to continue reaching out to him but we will need to cancel his procedure for today and reschedule in the future.

## 2020-07-24 ENCOUNTER — TELEPHONE (OUTPATIENT)
Dept: ENDOSCOPY | Facility: HOSPITAL | Age: 55
End: 2020-07-24
Payer: COMMERCIAL

## 2020-07-24 NOTE — TELEPHONE ENCOUNTER
Called number listed in message and this is not a working phone number. Googled number for [employer] Reyes, could not find employee named Pauly.

## 2020-08-01 ENCOUNTER — TELEPHONE (OUTPATIENT)
Dept: ENDOSCOPY | Facility: HOSPITAL | Age: 55
End: 2020-08-01

## 2020-09-21 ENCOUNTER — TELEPHONE (OUTPATIENT)
Dept: ENDOSCOPY | Facility: HOSPITAL | Age: 55
End: 2020-09-21

## 2020-09-21 DIAGNOSIS — Z13.9 SCREENING PROCEDURE: Primary | ICD-10-CM

## 2020-09-21 RX ORDER — POLYETHYLENE GLYCOL 3350, SODIUM SULFATE ANHYDROUS, SODIUM BICARBONATE, SODIUM CHLORIDE, POTASSIUM CHLORIDE 236; 22.74; 6.74; 5.86; 2.97 G/4L; G/4L; G/4L; G/4L; G/4L
4 POWDER, FOR SOLUTION ORAL ONCE
Qty: 4000 ML | Refills: 0 | Status: SHIPPED | OUTPATIENT
Start: 2020-09-21 | End: 2020-09-21

## 2020-09-21 NOTE — TELEPHONE ENCOUNTER
Attempted to schedule procedure, no answer. Left message to return call number provided. Also left message with pt's father. [email or web address]

## 2020-09-21 NOTE — TELEPHONE ENCOUNTER
Location Screening:    If answers yes to any of the following, schedule at [facility] ONLY. If No, OK for either location.    1. Is there a diagnosis of heart failure, severe heart valve disease (aortic stenosis) or mechanical valve? no  a. Is the Left Ventricle Ejection Fraction <30% ? no    2. Does the pt have pulmonary hypertension? no   a. Is pulmonary arterial pressure gradient >50mmHg? no   b. Is there evidence of right ventricular dysfunction? no    3. Does the pt have achalasia? no    4. Any history of negative reaction to anesthesia? no   a. Myasthenia gravis? no   b. Malignant hyperthermia? no   c. Other? no    5. Is procedure for esophageal banding? no      COVID Screening    1. Have you had a fever in the last 7 days or have you used fever reducing medicines for a fever in the last 7 days?  no    2. Are you experiencing shortness of breath, cough, muscle aches, loss of taste or loss of smell?  no    If answered yes to questions 1 and 2, the patient must seek medical attention with their PCP.  Do not schedule their procedure.     3. Are you residing with anyone who has tested positive for Covid?  no    If answered yes to question 3, recommend 14 day self-quarantine from the date of relative's positive test and place special needs note in the depot.  Wait to schedule.     ENDO screening    1. Have you been admitted for cardiac, kidney or pulmonary causes to the hospital in the past 3 months? no   If yes, schedule an appointment with PCP before scheduling endoscopic procedure.     2. Have you had a stent placed in the last 12 months? no   If yes, for a screening visit, cancel and message the ordering provider.  The patient will need a new order when the time is appropriate.     3. Have you had a stroke or heart attack in the past 6 months? no   If yes, cancel and refer patient to ordering provider for clearance, also message ordering provider to inform.     4. Have you had any chest pain in the past 3 months?  "no   If yes, Have you been evaluated by your PCP and/or cardiologist and it was determined to not be heart related? no   If No, Pt needs to be seen by PCP or Cardiologist .  Pt can be scheduled once clearance obtained by either of those providers.     5. Do you take prescription weight loss medications?  no   If yes, must stop for 2 weeks prior to procedure.     6. Have you been diagnosed with diverticulitis within the past 3 months? no   If yes, must have been seen by GI within the last 3 months, if not schedule with GI BR[facility].    If pt has been seen by GI, schedule procedure 8-12 weeks post antibiotic treatment.     7. Are you on Dialysis? yes  If yes, schedule procedure for the day AFTER dialysis.  Appt time should be 9am or later, patient arrival time is 2 hours prior.  Nulytely or miralax prep for all patients with kidney disease.     8. Are you diabetic?  no   If yes, schedule morning appt. Advise pt to hold all diabetic meds day of procedure.     9. If pt is older than 80 years of age and HAS NOT been seen by GI or PCP within the last 6 months, needs appt with GI BRITANY.   If pt has been seen by the GI provider or PCP within the past 6  months AND meets criteria, schedule procedure AND send message to the endoscopist.     10. Is patient on a "high risk" medication (blood thinner/antiplatelet agent)?  yes   If yes, has cardiac clearance been obtained within the last 60 days? No   If no, a new clearance needs to be obtained.     Final Questions:    1.I have reviewed the last colonoscopy for recommendations regarding next procedure bowel prep.  yes  2. I have reviewed medications and allergies.  yes  3. I have verified the pharmacy information and appropriate prep sent if needed. yes  4. Prep instructions have been mailed or sent to portal per patient request. yes        All schedulers will have ability to reach out to the ordering GI provider to clarify any issues.       "

## 2020-10-06 ENCOUNTER — PATIENT MESSAGE (OUTPATIENT)
Dept: ADMINISTRATIVE | Facility: HOSPITAL | Age: 55
End: 2020-10-06

## 2020-10-08 ENCOUNTER — HOSPITAL ENCOUNTER (OUTPATIENT)
Facility: HOSPITAL | Age: 55
Discharge: HOME | End: 2020-10-08
Attending: FAMILY MEDICINE | Admitting: FAMILY MEDICINE
Payer: COMMERCIAL

## 2020-10-08 ENCOUNTER — ANESTHESIA (OUTPATIENT)
Dept: ENDOSCOPY | Facility: HOSPITAL | Age: 55
End: 2020-10-08
Payer: MEDICARE

## 2020-10-08 ENCOUNTER — ANESTHESIA EVENT (OUTPATIENT)
Dept: ENDOSCOPY | Facility: HOSPITAL | Age: 55
End: 2020-10-08
Payer: MEDICARE

## 2020-10-08 VITALS
WEIGHT: 171.94 LBS | SYSTOLIC BLOOD PRESSURE: 112 MMHG | OXYGEN SATURATION: 100 % | DIASTOLIC BLOOD PRESSURE: 52 MMHG | TEMPERATURE: 98 F | HEIGHT: 68 IN | RESPIRATION RATE: 16 BRPM | BODY MASS INDEX: 26.06 KG/M2 | HEART RATE: 66 BPM

## 2020-10-08 DIAGNOSIS — K57.30 DIVERTICULOSIS OF COLON: Chronic | ICD-10-CM

## 2020-10-08 DIAGNOSIS — Z12.11 COLON CANCER SCREENING: Primary | ICD-10-CM

## 2020-10-08 DIAGNOSIS — K63.5 POLYP OF COLON, UNSPECIFIED PART OF COLON, UNSPECIFIED TYPE: ICD-10-CM

## 2020-10-08 DIAGNOSIS — Z86.010 HISTORY OF COLON POLYPS: ICD-10-CM

## 2020-10-08 LAB — POTASSIUM SERPL-SCNC: 4.6 MMOL/L (ref 3.5–5.1)

## 2020-10-08 PROCEDURE — 25000003 PHARM REV CODE 250: Mod: TXP | Performed by: NURSE ANESTHETIST, CERTIFIED REGISTERED

## 2020-10-08 PROCEDURE — 45385 PR COLONOSCOPY,REMV LESN,SNARE: ICD-10-PCS | Mod: TXP,,, | Performed by: FAMILY MEDICINE

## 2020-10-08 PROCEDURE — 84132 ASSAY OF SERUM POTASSIUM: CPT | Mod: NTX

## 2020-10-08 PROCEDURE — 63600175 PHARM REV CODE 636 W HCPCS: Mod: TXP | Performed by: NURSE ANESTHETIST, CERTIFIED REGISTERED

## 2020-10-08 PROCEDURE — 27201089 HC SNARE, DISP (ANY): Mod: NTX | Performed by: FAMILY MEDICINE

## 2020-10-08 PROCEDURE — 45380 PR COLONOSCOPY,BIOPSY: ICD-10-PCS | Mod: 59,TXP,, | Performed by: FAMILY MEDICINE

## 2020-10-08 PROCEDURE — 88305 TISSUE EXAM BY PATHOLOGIST: ICD-10-PCS | Mod: 26,NTX,, | Performed by: PATHOLOGY

## 2020-10-08 PROCEDURE — 45380 COLONOSCOPY AND BIOPSY: CPT | Mod: 59,TXP,, | Performed by: FAMILY MEDICINE

## 2020-10-08 PROCEDURE — 88305 TISSUE EXAM BY PATHOLOGIST: CPT | Mod: 26,NTX,, | Performed by: PATHOLOGY

## 2020-10-08 PROCEDURE — 45385 COLONOSCOPY W/LESION REMOVAL: CPT | Mod: NTX | Performed by: FAMILY MEDICINE

## 2020-10-08 PROCEDURE — 45380 COLONOSCOPY AND BIOPSY: CPT | Mod: TXP | Performed by: FAMILY MEDICINE

## 2020-10-08 PROCEDURE — 37000009 HC ANESTHESIA EA ADD 15 MINS: Mod: NTX | Performed by: FAMILY MEDICINE

## 2020-10-08 PROCEDURE — PATHNN PATH DEFICIENCY: ICD-10-PCS | Mod: ,,, | Performed by: FAMILY MEDICINE

## 2020-10-08 PROCEDURE — 37000008 HC ANESTHESIA 1ST 15 MINUTES: Mod: TXP | Performed by: FAMILY MEDICINE

## 2020-10-08 PROCEDURE — PATHNN PATH DEFICIENCY: Mod: ,,, | Performed by: FAMILY MEDICINE

## 2020-10-08 PROCEDURE — 45385 COLONOSCOPY W/LESION REMOVAL: CPT | Mod: TXP,,, | Performed by: FAMILY MEDICINE

## 2020-10-08 PROCEDURE — 27201012 HC FORCEPS, HOT/COLD, DISP: Mod: TXP | Performed by: FAMILY MEDICINE

## 2020-10-08 PROCEDURE — 88305 TISSUE EXAM BY PATHOLOGIST: CPT | Mod: TXP | Performed by: PATHOLOGY

## 2020-10-08 RX ORDER — LIDOCAINE HYDROCHLORIDE 10 MG/ML
INJECTION, SOLUTION EPIDURAL; INFILTRATION; INTRACAUDAL; PERINEURAL
Status: DISCONTINUED | OUTPATIENT
Start: 2020-10-08 | End: 2020-10-08

## 2020-10-08 RX ORDER — SODIUM CHLORIDE 0.9 % (FLUSH) 0.9 %
10 SYRINGE (ML) INJECTION
Status: DISCONTINUED | OUTPATIENT
Start: 2020-10-08 | End: 2021-02-02

## 2020-10-08 RX ORDER — SODIUM CHLORIDE, SODIUM LACTATE, POTASSIUM CHLORIDE, CALCIUM CHLORIDE 600; 310; 30; 20 MG/100ML; MG/100ML; MG/100ML; MG/100ML
INJECTION, SOLUTION INTRAVENOUS CONTINUOUS PRN
Status: DISCONTINUED | OUTPATIENT
Start: 2020-10-08 | End: 2020-10-08

## 2020-10-08 RX ORDER — PROPOFOL 10 MG/ML
VIAL (ML) INTRAVENOUS
Status: DISCONTINUED | OUTPATIENT
Start: 2020-10-08 | End: 2020-10-08

## 2020-10-08 RX ADMIN — PROPOFOL 40 MG: 10 INJECTION, EMULSION INTRAVENOUS at 01:10

## 2020-10-08 RX ADMIN — PROPOFOL 60 MG: 10 INJECTION, EMULSION INTRAVENOUS at 01:10

## 2020-10-08 RX ADMIN — SODIUM CHLORIDE, SODIUM LACTATE, POTASSIUM CHLORIDE, AND CALCIUM CHLORIDE: 600; 310; 30; 20 INJECTION, SOLUTION INTRAVENOUS at 12:10

## 2020-10-08 RX ADMIN — LIDOCAINE HYDROCHLORIDE 50 MG: 10 INJECTION, SOLUTION EPIDURAL; INFILTRATION; INTRACAUDAL; PERINEURAL at 01:10

## 2020-10-08 NOTE — ANESTHESIA POSTPROCEDURE EVALUATION
Anesthesia Post Evaluation    Patient: Erasto Low    Procedure(s) Performed: Procedure(s) (LRB):  COLONOSCOPY dialysis patient (N/A)    Final Anesthesia Type: MAC    Patient location during evaluation: PACU  Patient participation: Yes- Able to Participate  Level of consciousness: awake and alert  Post-procedure vital signs: reviewed and stable  Pain management: adequate  Airway patency: patent    PONV status at discharge: No PONV  Anesthetic complications: no      Cardiovascular status: blood pressure returned to baseline  Respiratory status: unassisted  Hydration status: euvolemic  Follow-up not needed.          Vitals Value Taken Time   /73 10/08/20 1237   Temp 36.5 °C (97.7 °F) 10/08/20 1235   Pulse 57 10/08/20 1235   Resp 18 10/08/20 1235   SpO2 99 % 10/08/20 1235         No case tracking events are documented in the log.      Pain/Julia Score: No data recorded

## 2020-10-08 NOTE — ANESTHESIA RELEASE NOTE
"Anesthesia Release from PACU Note    Patient: Erasto Low    Procedure(s) Performed: Procedure(s) (LRB):  COLONOSCOPY dialysis patient (N/A)    Anesthesia type: MAC    Post pain: Adequate analgesia    Post assessment: no apparent anesthetic complications    Last Vitals:   Visit Vitals  /73   Pulse (!) 57   Temp 36.5 °C (97.7 °F)   Resp 18   Ht 5' 8" (1.727 m)   Wt 78 kg (171 lb 15.3 oz)   SpO2 99%   BMI 26.15 kg/m²       Post vital signs: stable    Level of consciousness: awake    Nausea/Vomiting: no nausea/no vomiting    Complications: none    Airway Patency: patent    Respiratory: unassisted    Cardiovascular: stable and blood pressure at baseline    Hydration: euvolemic     "

## 2020-10-08 NOTE — ANESTHESIA PREPROCEDURE EVALUATION
10/08/2020  Erasto Low is a 54 y.o., male.    Anesthesia Evaluation    I have reviewed the Patient Summary Reports.    I have reviewed the Nursing Notes.       Review of Systems  Anesthesia Hx:  No problems with previous Anesthesia    Cardiovascular:   Exercise tolerance: good Hypertension    Renal/:   Chronic Renal Disease, ESRD, Dialysis        Physical Exam  General:  Well nourished    Airway/Jaw/Neck:  Airway Findings: Mouth Opening: Normal Tongue: Normal  General Airway Assessment: Adult  Mallampati: II  TM Distance: 4 - 6 cm  Jaw/Neck Findings:  Neck ROM: Normal ROM      Dental:  Dental Findings: In tact   Chest/Lungs:  Chest/Lungs Findings: Clear to auscultation, Normal Respiratory Rate     Heart/Vascular:  Heart Findings: Rate: Normal  Rhythm: Regular Rhythm  Sounds: Normal        Mental Status:  Mental Status Findings:  Cooperative, Alert and Oriented         Anesthesia Plan  Type of Anesthesia, risks & benefits discussed:  Anesthesia Type:  MAC  Patient's Preference:   Intra-op Monitoring Plan:   Intra-op Monitoring Plan Comments:   Post Op Pain Control Plan: multimodal analgesia  Post Op Pain Control Plan Comments:   Induction:   IV  Beta Blocker:         Informed Consent: Patient understands risks and agrees with Anesthesia plan.  Questions answered. Anesthesia consent signed with patient.  ASA Score: 4     Day of Surgery Review of History & Physical: I have interviewed and examined the patient. I have reviewed the patient's H&P dated:  There are no significant changes.  H&P update referred to the surgeon.  H&P completed by Anesthesiologist.       Ready For Surgery From Anesthesia Perspective.

## 2020-10-08 NOTE — DISCHARGE INSTRUCTIONS
Diverticulosis    Diverticulosis means that small pouches have formed in the wall of your large intestine (colon). Most often, this problem causes no symptoms and is common as people age. But the pouches in the colon are at risk of becoming infected. When this happens, the condition is called diverticulitis. Although most people with diverticulosis never develop diverticulitis, it is still not uncommon. Rectal bleeding can also occur and in less common situations, a type of colon inflammation called colitis.  While most people do not have symptoms, some people with diverticulosis may have:  · Abdominal cramps and pain  · Bloating  · Constipation  · Change in bowel habits  Causes  The exact cause of diverticulosis (and diverticulitis) has not been proved, but a few things are associated with the condition:  · Low-fiber diet  · Constipation  · Lack of exercise  Your healthcare provider will talk with you about how to manage your condition. Diet changes may be all that are needed to help control diverticulosis and prevent progression to diverticulitis. If you develop diverticulitis, you will likely need other treatments.  Home care  You may be told to take fiber supplements daily. Fiber adds bulk to the stool so that it passes through the colon more easily. Stool softeners may be recommended. You may also be given medications for pain relief. Be sure to take all medications as directed.  In the past, people were told to avoid corn, nuts, and seeds. This is no longer necessary.  Follow these guidelines when caring for yourself at home:  · Eat unprocessed foods that are high in fiber. Whole grains, fruits, and vegetables are good choices.  · Drink 6 to 8 glasses of water every day unless your healthcare provider has you limit how much fluid you should have.  · Watch for changes in your bowel movements. Tell your provider if you notice any changes.  · Begin an exercise program. Ask your provider how to get started.  Generally, walking is the best.  · Get plenty of rest and sleep.  Follow-up care  Follow up with your healthcare provider, or as advised. Regular visits may be needed to check on your health. Sometimes special procedures such as colonoscopy, are needed after an episode of diverticulitis or blooding. Be sure to keep all your appointments.  If a stool sample was taken, or cultures were done, you should be told if they are positive, or if your treatment needs to be changed. You can call as directed for the results.  If X-rays were done, a radiologist will look at them. You will be told if there is a change in your treatment.  If antibiotics were prescribed, be sure to finish them all.  When to seek medical advice  Call your healthcare provider right away if any of these occur:  · Fever of 100.4°F (38°C) or higher, or as directed by your healthcare provider  · Severe cramps in the lower left side of the abdomen or pain that is getting worse  · Tenderness in the lower left side of the abdomen or worsening pain throughout the abdomen  · Diarrhea or constipation that doesn't get better within 24 hours  · Nausea and vomiting  · Bleeding from the rectum  Call 911  Call emergency services if any of the following occur:  · Trouble breathing  · Confusion  · Very drowsy or trouble awakening  · Fainting or loss of consciousness  · Rapid heart rate  · Chest pain  Date Last Reviewed: 12/30/2015 © 2000-2017 [employer]. [address] [place] [address]. All rights reserved. This information is not intended as a substitute for professional medical care. Always follow your healthcare professional's instructions.        Understanding Colon and Rectal Polyps    The colon (also called the large intestine) is a muscular tube that forms the last part of the digestive tract. It absorbs water and stores food waste. The colon is about 4 to 6 feet long. The rectum is the last 6 inches of the colon. The colon and rectum  have a smooth lining composed of millions of cells. Changes in these cells can lead to growths in the colon that can become cancerous and should be removed. Multiple tests are available to screen for colon cancer, but the colonoscopy is the most recommended test. During colonoscopy, these polyps can be removed. How often you need this test depends on many things including your condition, your family history, symptoms, and what the findings were at the previous colonoscopy.   When the colon lining changes  Changes that happen in the cells that line the colon or rectum can lead to growths called polyps. Over a period of years, polyps can turn cancerous. Removing polyps early may prevent cancer from ever forming.  Polyps  Polyps are fleshy clumps of tissue that form on the lining of the colon or rectum. Small polyps are usually benign (not cancerous). However, over time, cells in a polyp can change and become cancerous. Certain types of polyps known as adenomatous polyps are premalignant. The risk for invasive cancer increases with the size of the polyp and certain cell and gene features. This means that they can become cancerous if they're not removed. Hyperplastic polyps are benign. They can grow quite large and not turn cancerous.   Cancer  Almost all colorectal cancers start when polyp cells begin growing abnormally. As a cancerous tumor grows, it may involve more and more of the colon or rectum. In time, cancer can also grow beyond the colon or rectum and spread to nearby organs or to glands called lymph nodes. The cells can also travel to other parts of the body. This is known as metastasis. The earlier a cancerous tumor is removed, the better the chance of preventing its spread.    Date Last Reviewed: 8/1/2016  © 6583-3151 [employer]. [address] [place] [address], [place] [address]. All rights reserved. This information is not intended as a substitute for professional medical care. Always follow your  healthcare professional's instructions.

## 2020-10-08 NOTE — DISCHARGE SUMMARY
Endoscopy Discharge Summary      Admit Date: 10/8/2020    Discharge Date and Time:  10/8/2020 1:59 PM    Attending Physician: Baltazar Swift MD     Discharge Physician: Baltazar Swift MD     Principal Admitting Diagnoses: Colon cancer screening         Discharge Diagnosis: The primary encounter diagnosis was Colon cancer screening. Diagnoses of History of colon polyps, Diverticulosis of colon (without mention of hemorrhage), and Polyp of colon, unspecified part of colon, unspecified type were also pertinent to this visit.     Discharged Condition: Good    Indication for Admission: Colon cancer screening     Hospital Course: Patient was admitted for an inpatient procedure and tolerated the procedure well with no complications.    Significant Diagnostic Studies: Colonoscopy with cold forcep biopsy and Colonoscopy with hot snare polypectomy    Pathology (if any):  Specimen (12h ago, onward)    None          Estimated Blood Loss: 1 ml.    Discussed with: patient and family.    Disposition: Home.    Follow Up/Patient Instructions:   Current Discharge Medication List      CONTINUE these medications which have NOT CHANGED    Details   !! amlodipine (NORVASC) 5 MG tablet Take 5 mg by mouth once daily.      !! amLODIPine (NORVASC) 5 MG tablet Take 5 mg by mouth.      !! clonazepam (KLONOPIN) 0.5 MG tablet Take 0.5 mg by mouth Once daily as needed. 1 tablet Oral      !! clonazePAM (KLONOPIN) 0.5 MG tablet Take 0.5 mg by mouth.      multivitamin capsule Take 1 capsule by mouth once daily.      multivitamin with folic acid 400 mcg Tab Take 1 tablet by mouth.      acetaminophen (TYLENOL) 650 MG TbSR Take 650 mg by mouth.      VELPHORO 500 mg Chew CHEW AND SWALLOW TWO TABLETS THREE TIMES DAILY TAKE WITH FOOD  Refills: 1       !! - Potential duplicate medications found. Please discuss with provider.          Discharge Procedure Orders   Diet general     Call MD for:  temperature >100.4     Call MD for:  persistent nausea and  vomiting     Call MD for:  severe uncontrolled pain     Call MD for:  difficulty breathing, headache or visual disturbances     Call MD for:  redness, tenderness, or signs of infection (pain, swelling, redness, odor or green/yellow discharge around incision site)     Call MD for:  hives     Call MD for:  persistent dizziness or light-headedness     No dressing needed       Follow-up Information     Baltazar Swift MD. Call in 2 weeks.    Specialty: Family Medicine  Why: To receive pathology results.  Contact information:  [id number] [facility] [address]  [place] [id number] [phone]

## 2020-10-08 NOTE — TRANSFER OF CARE
"Anesthesia Transfer of Care Note    Patient: Erasto Low    Procedure(s) Performed: Procedure(s) (LRB):  COLONOSCOPY dialysis patient (N/A)    Patient location: PACU    Anesthesia Type: MAC    Transport from OR: Transported from OR on room air with adequate spontaneous ventilation    Post pain: adequate analgesia    Post assessment: no apparent anesthetic complications    Post vital signs: stable    Level of consciousness: awake    Nausea/Vomiting: no nausea/vomiting    Complications: none    Transfer of care protocol was followed      Last vitals:   Visit Vitals  /73   Pulse (!) 57   Temp 36.5 °C (97.7 °F)   Resp 18   Ht 5' 8" (1.727 m)   Wt 78 kg (171 lb 15.3 oz)   SpO2 99%   BMI 26.15 kg/m²     "

## 2020-10-08 NOTE — H&P
Short Stay Endoscopy History and Physical    PCP - Gautam Rubalcava MD    Procedure - Colonoscopy  ASA - 4  Mallampati - per anesthesia  History of Anesthesia problems - no  Family history Anesthesia problems -  no     HPI:  This is a 54 y.o. male here for evaluation of :   Active Hospital Problems    Diagnosis  POA    *Colon cancer screening [Z12.11]  Not Applicable    History of colon polyps [Z86.010]  Not Applicable      Resolved Hospital Problems   No resolved problems to display.         Health Maintenance       Date Due Completion Date    Hepatitis C Screening 1963 ---    HIV Screening 1978 ---    TETANUS VACCINE 1981 ---    Shingles Vaccine (1 of 2) 2013 ---    Pneumococcal Vaccine (Highest Risk) (2 of 3 - PCV13) 02/15/2017 2/15/2016    Lipid Panel 2018    Colorectal Cancer Screening 2019    Influenza Vaccine (1) 2020          Screening - Yes  History of polyps - Yes     Diarrhea - no  Anemia - no  Blood in stools - no  Abdominal pain - no  Other - no    ROS:  CONSTITUTIONAL: Denies weight change,  fatigue, fevers, chills, night sweats.  CARDIOVASCULAR: Denies chest pain, shortness of breath, orthopnea and edema.  RESPIRATORY: Denies cough, hemoptysis, dyspnea, and wheezing.  GI: See HPI.    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-     Failed kidney transplant #2  donor 2001-2005     HTN (hypertension) 2014    Osteoporosis, unspecified     Renal dialysis status(V45.11)     Renal hypertension        Surgical History:   Past Surgical History:   Procedure Laterality Date    AV FISTULA PLACEMENT      left upper arm    HERNIA REPAIR      HIP ARTHROPLASTY      left    KIDNEY SURGERY       as a child due to reflux    KIDNEY TRANSPLANT       KIDNEY TRANSPLANT      LUNG REMOVAL, PARTIAL      NEPHRECTOMY  2011    left native- benign mass    PARATHYROIDECTOMY      forearm implant    PROSTATECTOMY  2017    TOTAL SHOULDER ARTHROPLASTY      right       Family History:   Family History   Problem Relation Age of Onset    Kidney disease Mother         ESRD from [place] Syndrome    Hypertension Mother     Hypertension Father     Kidney disease Brother         ESRD from [place]    Hypertension Brother     Kidney disease Maternal Uncle         ESRD from [place]    Hypertension Maternal Uncle     Diabetes Neg Hx     Heart attack Neg Hx     Heart disease Neg Hx        Social History:   Social History     Tobacco Use    Smoking status: Never Smoker    Smokeless tobacco: Current User     Types: Snuff   Substance Use Topics    Alcohol use: Yes     Comment: rarely    Drug use: No       Allergies:   Review of patient's allergies indicates:   Allergen Reactions    Ether     Cyclosporine Rash    Ether Rash       Medications:   No current facility-administered medications on file prior to encounter.      Current Outpatient Medications on File Prior to Encounter   Medication Sig Dispense Refill    amlodipine (NORVASC) 5 MG tablet Take 5 mg by mouth once daily.      amLODIPine (NORVASC) 5 MG tablet Take 5 mg by mouth.      clonazepam (KLONOPIN) 0.5 MG tablet Take 0.5 mg by mouth Once daily as needed. 1 tablet Oral      clonazePAM (KLONOPIN) 0.5 MG tablet Take 0.5 mg by mouth.      multivitamin capsule Take 1 capsule by mouth once daily.      multivitamin with folic acid 400 mcg Tab Take 1 tablet by mouth.      acetaminophen (TYLENOL) 650 MG TbSR Take 650 mg by mouth.      VELPHORO 500 mg Chew CHEW AND SWALLOW TWO TABLETS THREE TIMES DAILY TAKE WITH FOOD  1       Physical Exam:  Vital Signs:   Vitals:    10/08/20 1237   BP: 137/73   Pulse:    Resp:    Temp:      General Appearance: Well appearing in no acute distress  ENT: OP clear  Chest: CTA  B  CV: RRR, no m/r/g  Abd: s/nt/nd/nabs  Ext: no edema    Labs:Reviewed    IMP:  Active Hospital Problems    Diagnosis  POA    *Colon cancer screening [Z12.11]  Not Applicable    History of colon polyps [Z86.010]  Not Applicable      Resolved Hospital Problems   No resolved problems to display.         Plan:   I have explained the risks and benefits of colonoscopy to the patient including but not limited to bleeding, perforation, infection, and death. The patient wishes to proceed.

## 2020-10-08 NOTE — PROVATION PATIENT INSTRUCTIONS
Discharge Summary/Instructions after an Endoscopic Procedure  Patient Name: Erasto Low  Patient MRN: [id number]  Patient YOB: 1965 Thursday, October 8, 2020 Baltazar Swift MD  RESTRICTIONS:  During your procedure today, you received medications for sedation.  These   medications may affect your judgment, balance and coordination.  Therefore,   for 24 hours, you have the following restrictions:   - DO NOT drive a car, operate machinery, make legal/financial decisions,   sign important papers or drink alcohol.    ACTIVITY:  Today: no heavy lifting, straining or running due to procedural   sedation/anesthesia.  The following day: return to full activity including work.  DIET:  Eat and drink normally unless instructed otherwise.     TREATMENT FOR COMMON SIDE EFFECTS:  - Mild abdominal pain, nausea, belching, bloating or excessive gas:  rest,   eat lightly and use a heating pad.  - Sore Throat: treat with throat lozenges and/or gargle with warm salt   water.  - Because air was used during the procedure, expelling large amounts of air   from your rectum or belching is normal.  - If a bowel prep was taken, you may not have a bowel movement for 1-3 days.    This is normal.  SYMPTOMS TO WATCH FOR AND REPORT TO YOUR PHYSICIAN:  1. Abdominal pain or bloating, other than gas cramps.  2. Chest pain.  3. Back pain.  4. Signs of infection such as: chills or fever occurring within 24 hours   after the procedure.  5. Rectal bleeding, which would show as bright red, maroon, or black stools.   (A tablespoon of blood from the rectum is not serious, especially if   hemorrhoids are present.)  6. Vomiting.  7. Weakness or dizziness.  GO DIRECTLY TO THE NEAREST EMERGENCY ROOM IF YOU HAVE ANY OF THE FOLLOWING:      Difficulty breathing              Chills and/or fever over 101 F   Persistent vomiting and/or vomiting blood   Severe abdominal pain   Severe chest pain   Black, tarry stools   Bleeding- more than one  tablespoon   Any other symptom or condition that you feel may need urgent attention  Your doctor recommends these additional instructions:  If any biopsies were taken, your doctors clinic will contact you in 1 to 2   weeks with any results.  - Patient has a contact number available for emergencies.  The signs and   symptoms of potential delayed complications were discussed with the   patient.  Return to normal activities tomorrow.  Written discharge   instructions were provided to the patient.   - Resume previous diet.   - Continue present medications.   - Await pathology results.   - Repeat colonoscopy in 3 years for surveillance.   - Telephone my office for pathology results in 2 weeks.   - Discharge patient to home (via wheelchair).  For questions, problems or results please call your physician Baltazar Swift MD at Work:  [phone]  If you have any questions about the above instructions, call the GI   department at [phone] or call the endoscopy unit at [phone]   from 7am until 3 pm.  [facility], EMERGENCY ROOM PHONE NUMBER:   [phone]  IF A COMPLICATION OR EMERGENCY SITUATION ARISES AND YOU ARE UNABLE TO REACH   YOUR PHYSICIAN - GO DIRECTLY TO THE EMERGENCY ROOM.  I have read or have had read to me these discharge instructions for my   procedure and have received a written copy.  I understand these   instructions and will follow-up with my physician if I have any questions.     __________________________________       _____________________________________  Nurse Signature                                          Patient/Designated   Responsible Party Signature  Baltazar Swift MD  10/8/2020 1:57:00 PM  This report has been verified and signed electronically.  PROVATION

## 2020-10-12 LAB
FINAL PATHOLOGIC DIAGNOSIS: NORMAL
GROSS: NORMAL
Lab: NORMAL

## 2020-10-13 NOTE — PROGRESS NOTES
Dear Gautam Rubalcava MD,    I recently cared for Erasto Low and performed an endoscopy.  Tissue was sent for pathology evaluation and I will have a letter written to ask the patient to repeat the colonoscopy in 3 years.  The pathology showed that there was adenomatous tissue present.  Thank you for allowing me to participate in the care of your patient.  Please call me for any questions that you might have.      Dr. Baltzaar Swift  [phone] cell  [phone] office      NURSING STAFF:Please  inform the patient that I reviewed the recent pathology obtained at the time of colonoscopy.    The results showed that there was adenomatous tissue present which is benign and based on that, I recommend that the patient have a repeat colonoscopy performed in 3 years.     If the patient has MyChart, this message has been sent to them.  Confirm that they read the note.  If not, copy the information and print a letter to send to the patient at this time.  Confirm that a notation to the PCP was done.      Dear Erasto Low,    This is to inform you that I have reviewed your recent colonoscopy pathology.  The results showed that you had adenomatous tissue present which is benign and based on that, I recommend that you have a repeat colonoscopy performed in 3 years.      Dr. Baltazar Swift  [phone]

## 2020-10-13 NOTE — PROGRESS NOTES
Case reviewed with Dr. Olivares.  Ok to reactivate patient on the kidney transplant waiting list.  Patient notified.

## 2020-10-13 NOTE — LETTER
October 13, 2020    Erasto Low  [address] [place] [address]    Dear Erasto oLw:  MRN: [id number]    The [facility] transplant team reviewed your transplant candidacy.  It is our programs opinion that you are a transplant candidate and are re-activated at our facility as of 10/13/2020.  You are now eligible to receive a kidney transplant.     Attached is a letter from the United Network for Organ Sharing (UNOS).  It describes the services and information offered to patients by [facility] and the Organ Procurement and Transplant Network.    The [facility] transplant team remains available to answer any questions.  Should you have any questions regarding this decision, please do not hesitate to contact our pre-transplant office.      Sincerely  Dereck Macias Jr., RN, CCT, [facility]  Senior Kidney Transplant Coordinator  [facility]  [address] [place] [address]  [id number]  Phone [phone]  Fax [phone]    tj/Enclosed    Cc:  Dr. Markel Roman         [facility]          The Organ Procurement and Transplantation Network   Toll-free patient services line: Your resource for organ transplant information     If you have a question regarding your own medical care, you always should call your transplant hospital first. However, for general organ transplant-related information, you can call the Organ Procurement and Transplantation Network (OPTN) toll-free patient services line at [phone].     Anyone, including potential transplant candidates, candidates, recipients, family members, friends, living donors, and donor family members, can call this number to:     · Talk about organ donation, living donation, the transplant process, the donation process, and transplant policies.   · Get a free patient information kit with helpful booklets, waiting list and transplant information, and a list of all transplant hospitals.   · Ask questions about the OPTN website  (https://optn.transplant.hrsa.gov/), the United Network for Organ Sharings (UNOS) website (https://unos.org/), or the UNOS website for living donors and transplant recipients. (https://www.transplantliving.org/).   · Learn how the OPTN can help you.   · Talk about any concerns that you may have with a transplant hospital.     The nations transplant system, the OPTN, is managed under federal contract by the United Network for Organ Sharing (UNOS), which is a non-profit charitable organization. The OPTN helps create and define organ sharing policies that make the best use of donated organs. This process continuously evaluating new advances and discoveries so policies can be adapted to best serve patients waiting for transplants. To do so, the OPTN works closely with transplant professionals, transplant patients, transplant candidates, donor families, living donors, and the public. All transplant programs and organ procurement organizations throughout the country are OPTN members and are obligated to follow the policies the OPTN creates for allocating organs.     The OPTN also is responsible for:   · Providing educational material for patients, the public, and professionals.   · Raising awareness of the need for donated organs and tissue.   · Coordinating organ procurement, matching, and placement.   · Collecting information about every organ transplant and donation that occurs in the [place].     Remember, you should contact your transplant hospital directly if you have questions or concerns about your own medical care including medical records, work-up progress, and test results.     We are not your transplant hospital, and our staff will not be able to answer questions about your case, so please keep your transplant hospitals phone number handy.   However, while you research your transplant needs and learn as much as you can about transplantation and donation, we welcome your call to our toll-free patient  services line at [phone].

## 2021-01-08 ENCOUNTER — PATIENT MESSAGE (OUTPATIENT)
Dept: TRANSPLANT | Facility: CLINIC | Age: 56
End: 2021-01-08

## 2021-01-14 ENCOUNTER — TELEPHONE (OUTPATIENT)
Dept: TRANSPLANT | Facility: CLINIC | Age: 56
End: 2021-01-14

## 2021-01-14 DIAGNOSIS — Z76.82 AWAITING ORGAN TRANSPLANT STATUS: Primary | ICD-10-CM

## 2021-01-15 ENCOUNTER — TELEPHONE (OUTPATIENT)
Dept: TRANSPLANT | Facility: CLINIC | Age: 56
End: 2021-01-15

## 2021-01-15 ENCOUNTER — ANESTHESIA (OUTPATIENT)
Dept: SURGERY | Facility: HOSPITAL | Age: 56
End: 2021-01-15
Payer: MEDICARE

## 2021-01-15 ENCOUNTER — ANESTHESIA EVENT (OUTPATIENT)
Dept: SURGERY | Facility: HOSPITAL | Age: 56
End: 2021-01-15
Payer: MEDICARE

## 2021-01-15 PROCEDURE — 25000003 PHARM REV CODE 250: Mod: TXP | Performed by: STUDENT IN AN ORGANIZED HEALTH CARE EDUCATION/TRAINING PROGRAM

## 2021-01-15 PROCEDURE — 63600175 PHARM REV CODE 636 W HCPCS: Mod: TXP | Performed by: STUDENT IN AN ORGANIZED HEALTH CARE EDUCATION/TRAINING PROGRAM

## 2021-01-15 PROCEDURE — D9220A PRA ANESTHESIA: Mod: CRNA,NTX,, | Performed by: STUDENT IN AN ORGANIZED HEALTH CARE EDUCATION/TRAINING PROGRAM

## 2021-01-15 PROCEDURE — D9220A PRA ANESTHESIA: Mod: ANES,NTX,, | Performed by: ANESTHESIOLOGY

## 2021-01-15 PROCEDURE — D9220A PRA ANESTHESIA: ICD-10-PCS | Mod: ANES,NTX,, | Performed by: ANESTHESIOLOGY

## 2021-01-15 PROCEDURE — D9220A PRA ANESTHESIA: ICD-10-PCS | Mod: CRNA,NTX,, | Performed by: STUDENT IN AN ORGANIZED HEALTH CARE EDUCATION/TRAINING PROGRAM

## 2021-01-15 RX ORDER — ONDANSETRON 2 MG/ML
INJECTION INTRAMUSCULAR; INTRAVENOUS
Status: DISCONTINUED | OUTPATIENT
Start: 2021-01-15 | End: 2021-01-15

## 2021-01-15 RX ORDER — NEOSTIGMINE METHYLSULFATE 0.5 MG/ML
INJECTION, SOLUTION INTRAVENOUS
Status: DISCONTINUED | OUTPATIENT
Start: 2021-01-15 | End: 2021-01-15

## 2021-01-15 RX ORDER — MIDAZOLAM HYDROCHLORIDE 1 MG/ML
INJECTION INTRAMUSCULAR; INTRAVENOUS
Status: DISCONTINUED | OUTPATIENT
Start: 2021-01-15 | End: 2021-01-15

## 2021-01-15 RX ORDER — PROPOFOL 10 MG/ML
VIAL (ML) INTRAVENOUS
Status: DISCONTINUED | OUTPATIENT
Start: 2021-01-15 | End: 2021-01-15

## 2021-01-15 RX ORDER — SODIUM CHLORIDE 9 MG/ML
INJECTION, SOLUTION INTRAVENOUS CONTINUOUS PRN
Status: DISCONTINUED | OUTPATIENT
Start: 2021-01-15 | End: 2021-01-15

## 2021-01-15 RX ORDER — LIDOCAINE HYDROCHLORIDE 20 MG/ML
INJECTION INTRAVENOUS
Status: DISCONTINUED | OUTPATIENT
Start: 2021-01-15 | End: 2021-01-15

## 2021-01-15 RX ORDER — FENTANYL CITRATE 50 UG/ML
INJECTION, SOLUTION INTRAMUSCULAR; INTRAVENOUS
Status: DISCONTINUED | OUTPATIENT
Start: 2021-01-15 | End: 2021-01-15

## 2021-01-15 RX ORDER — CISATRACURIUM BESYLATE 10 MG/ML
INJECTION, SOLUTION INTRAVENOUS
Status: DISCONTINUED | OUTPATIENT
Start: 2021-01-15 | End: 2021-01-15

## 2021-01-15 RX ADMIN — FENTANYL CITRATE 50 MCG: 50 INJECTION, SOLUTION INTRAMUSCULAR; INTRAVENOUS at 08:01

## 2021-01-15 RX ADMIN — LIDOCAINE HYDROCHLORIDE 100 MG: 20 INJECTION, SOLUTION INTRAVENOUS at 08:01

## 2021-01-15 RX ADMIN — SODIUM CHLORIDE: 0.9 INJECTION, SOLUTION INTRAVENOUS at 08:01

## 2021-01-15 RX ADMIN — FENTANYL CITRATE 50 MCG: 50 INJECTION, SOLUTION INTRAMUSCULAR; INTRAVENOUS at 09:01

## 2021-01-15 RX ADMIN — MIDAZOLAM HYDROCHLORIDE 2 MG: 1 INJECTION, SOLUTION INTRAMUSCULAR; INTRAVENOUS at 08:01

## 2021-01-15 RX ADMIN — NEOSTIGMINE METHYLSULFATE 4 MG: 0.5 INJECTION INTRAVENOUS at 10:01

## 2021-01-15 RX ADMIN — ONDANSETRON 4 MG: 2 INJECTION, SOLUTION INTRAMUSCULAR; INTRAVENOUS at 10:01

## 2021-01-15 RX ADMIN — CISATRACURIUM BESYLATE 12 MG: 10 INJECTION INTRAVENOUS at 08:01

## 2021-01-15 RX ADMIN — PROPOFOL 150 MG: 10 INJECTION, EMULSION INTRAVENOUS at 08:01

## 2021-01-17 ENCOUNTER — PATIENT MESSAGE (OUTPATIENT)
Dept: TRANSPLANT | Facility: CLINIC | Age: 56
End: 2021-01-17

## 2021-01-21 ENCOUNTER — TELEPHONE (OUTPATIENT)
Dept: TRANSPLANT | Facility: CLINIC | Age: 56
End: 2021-01-21

## 2021-01-28 PROCEDURE — 99001 SPECIMEN HANDLING PT-LAB: CPT | Mod: PO,TXP

## 2021-02-02 ENCOUNTER — PATIENT MESSAGE (OUTPATIENT)
Dept: UROLOGY | Facility: CLINIC | Age: 56
End: 2021-02-02

## 2021-02-02 ENCOUNTER — OFFICE VISIT (OUTPATIENT)
Dept: UROLOGY | Facility: CLINIC | Age: 56
End: 2021-02-02
Payer: COMMERCIAL

## 2021-02-02 ENCOUNTER — PATIENT MESSAGE (OUTPATIENT)
Dept: TRANSPLANT | Facility: CLINIC | Age: 56
End: 2021-02-02

## 2021-02-02 VITALS
HEIGHT: 68 IN | DIASTOLIC BLOOD PRESSURE: 69 MMHG | SYSTOLIC BLOOD PRESSURE: 106 MMHG | WEIGHT: 180.75 LBS | BODY MASS INDEX: 27.39 KG/M2 | HEART RATE: 89 BPM

## 2021-02-02 DIAGNOSIS — N18.6 ESRD ON HEMODIALYSIS: ICD-10-CM

## 2021-02-02 DIAGNOSIS — Z90.79 HISTORY OF ROBOT-ASSISTED LAPAROSCOPIC RADICAL PROSTATECTOMY: ICD-10-CM

## 2021-02-02 DIAGNOSIS — Z99.2 ESRD ON HEMODIALYSIS: ICD-10-CM

## 2021-02-02 DIAGNOSIS — N35.913 MEMBRANOUS URETHRAL STRICTURE: Primary | ICD-10-CM

## 2021-02-02 DIAGNOSIS — C61 PROSTATE CANCER: ICD-10-CM

## 2021-02-02 DIAGNOSIS — Q87.81 ALPORT SYNDROME: ICD-10-CM

## 2021-02-02 DIAGNOSIS — N35.014 POST-TRAUMATIC MALE URETHRAL STRICTURE: ICD-10-CM

## 2021-02-02 PROCEDURE — 99203 OFFICE O/P NEW LOW 30 MIN: CPT | Mod: S$GLB,TXP,, | Performed by: NURSE PRACTITIONER

## 2021-02-02 PROCEDURE — 99203 PR OFFICE/OUTPT VISIT, NEW, LEVL III, 30-44 MIN: ICD-10-PCS | Mod: S$GLB,TXP,, | Performed by: NURSE PRACTITIONER

## 2021-02-02 PROCEDURE — 99999 PR PBB SHADOW E&M-EST. PATIENT-LVL III: CPT | Mod: PBBFAC,TXP,, | Performed by: NURSE PRACTITIONER

## 2021-02-02 PROCEDURE — 99999 PR PBB SHADOW E&M-EST. PATIENT-LVL III: ICD-10-PCS | Mod: PBBFAC,TXP,, | Performed by: NURSE PRACTITIONER

## 2021-02-05 ENCOUNTER — PATIENT MESSAGE (OUTPATIENT)
Dept: UROLOGY | Facility: CLINIC | Age: 56
End: 2021-02-05

## 2021-02-05 ENCOUNTER — TELEPHONE (OUTPATIENT)
Dept: UROLOGY | Facility: CLINIC | Age: 56
End: 2021-02-05

## 2021-02-08 ENCOUNTER — OFFICE VISIT (OUTPATIENT)
Dept: UROLOGY | Facility: CLINIC | Age: 56
End: 2021-02-08
Payer: MEDICARE

## 2021-02-08 DIAGNOSIS — N32.0 BLADDER NECK CONTRACTURE: ICD-10-CM

## 2021-02-08 DIAGNOSIS — R34 ANURIA: ICD-10-CM

## 2021-02-08 DIAGNOSIS — Z99.2 DEPENDENCE ON RENAL DIALYSIS: ICD-10-CM

## 2021-02-08 DIAGNOSIS — T86.12 FAILED KIDNEY TRANSPLANT: Primary | ICD-10-CM

## 2021-02-08 DIAGNOSIS — Z90.79 H/O RADICAL PROSTATECTOMY: ICD-10-CM

## 2021-02-08 PROCEDURE — 99443 PR PHYSICIAN TELEPHONE EVALUATION 21-30 MIN: CPT | Mod: 95,TXP,, | Performed by: UROLOGY

## 2021-02-08 PROCEDURE — 99443 PR PHYSICIAN TELEPHONE EVALUATION 21-30 MIN: ICD-10-PCS | Mod: 95,TXP,, | Performed by: UROLOGY

## 2021-02-09 ENCOUNTER — LAB VISIT (OUTPATIENT)
Dept: LAB | Facility: HOSPITAL | Age: 56
End: 2021-02-09
Payer: COMMERCIAL

## 2021-02-09 ENCOUNTER — EPISODE CHANGES (OUTPATIENT)
Dept: TRANSPLANT | Facility: CLINIC | Age: 56
End: 2021-02-09

## 2021-02-09 ENCOUNTER — TELEPHONE (OUTPATIENT)
Dept: UROLOGY | Facility: CLINIC | Age: 56
End: 2021-02-09

## 2021-02-09 ENCOUNTER — PATIENT MESSAGE (OUTPATIENT)
Dept: UROLOGY | Facility: CLINIC | Age: 56
End: 2021-02-09

## 2021-02-09 DIAGNOSIS — Z76.82 AWAITING ORGAN TRANSPLANT STATUS: ICD-10-CM

## 2021-02-09 DIAGNOSIS — R34 ANURIA: ICD-10-CM

## 2021-02-09 DIAGNOSIS — N32.0 BLADDER NECK CONTRACTURE: Primary | ICD-10-CM

## 2021-02-09 DIAGNOSIS — N18.6 ESRD ON HEMODIALYSIS: ICD-10-CM

## 2021-02-09 DIAGNOSIS — Z90.79 H/O RADICAL PROSTATECTOMY: ICD-10-CM

## 2021-02-09 DIAGNOSIS — Z99.2 ESRD ON HEMODIALYSIS: ICD-10-CM

## 2021-02-10 ENCOUNTER — TELEPHONE (OUTPATIENT)
Dept: TRANSPLANT | Facility: CLINIC | Age: 56
End: 2021-02-10

## 2021-02-18 ENCOUNTER — HOSPITAL ENCOUNTER (OUTPATIENT)
Dept: RADIOLOGY | Facility: HOSPITAL | Age: 56
Discharge: HOME | End: 2021-02-18
Attending: UROLOGY
Payer: COMMERCIAL

## 2021-02-18 ENCOUNTER — TELEPHONE (OUTPATIENT)
Dept: UROLOGY | Facility: CLINIC | Age: 56
End: 2021-02-18

## 2021-02-18 DIAGNOSIS — Z90.79 H/O RADICAL PROSTATECTOMY: ICD-10-CM

## 2021-02-18 DIAGNOSIS — Z99.2 ESRD ON HEMODIALYSIS: ICD-10-CM

## 2021-02-18 DIAGNOSIS — R34 ANURIA: ICD-10-CM

## 2021-02-18 DIAGNOSIS — N18.6 ESRD ON HEMODIALYSIS: ICD-10-CM

## 2021-02-18 LAB
HLA FREEZE AND HOLD INTERPRETATION: NORMAL
HLAFH COLLECTION DATE: NORMAL
HPRA INTERPRETATION: NORMAL

## 2021-02-18 PROCEDURE — 76857 US EXAM PELVIC LIMITED: CPT | Mod: TC,TXP

## 2021-02-19 ENCOUNTER — COMMITTEE REVIEW (OUTPATIENT)
Dept: TRANSPLANT | Facility: CLINIC | Age: 56
End: 2021-02-19

## 2021-02-22 ENCOUNTER — TELEPHONE (OUTPATIENT)
Dept: TRANSPLANT | Facility: CLINIC | Age: 56
End: 2021-02-22

## 2021-02-22 ENCOUNTER — TELEPHONE (OUTPATIENT)
Dept: UROLOGY | Facility: CLINIC | Age: 56
End: 2021-02-22

## 2021-02-24 PROCEDURE — 86832 HLA CLASS I HIGH DEFIN QUAL: CPT | Mod: PO | Performed by: TRANSPLANT SURGERY

## 2021-02-24 PROCEDURE — 86833 HLA CLASS II HIGH DEFIN QUAL: CPT | Mod: PO | Performed by: TRANSPLANT SURGERY

## 2021-03-01 ENCOUNTER — LAB VISIT (OUTPATIENT)
Dept: LAB | Facility: HOSPITAL | Age: 56
End: 2021-03-01
Payer: COMMERCIAL

## 2021-03-01 DIAGNOSIS — Z76.82 AWAITING ORGAN TRANSPLANT STATUS: ICD-10-CM

## 2021-03-04 LAB — HPRA INTERPRETATION: NORMAL

## 2021-03-10 LAB
CLASS I ANTIBODIES - LUMINEX: NORMAL
CLASS I ANTIBODY COMMENTS - LUMINEX: NORMAL
CLASS II ANTIBODIES - LUMINEX: NORMAL
CLASS II ANTIBODY COMMENTS - LUMINEX: NORMAL
CPRA %: 78
SERUM COLLECTION DT - LUMINEX CLASS I: NORMAL
SERUM COLLECTION DT - LUMINEX CLASS II: NORMAL
SPCL1 TESTING DATE: NORMAL
SPCL2 TESTING DATE: NORMAL
SPCLU TESTING DATE: NORMAL

## 2021-03-17 PROCEDURE — 99001 SPECIMEN HANDLING PT-LAB: CPT | Mod: PO | Performed by: TRANSPLANT SURGERY

## 2021-03-23 ENCOUNTER — LAB VISIT (OUTPATIENT)
Dept: LAB | Facility: HOSPITAL | Age: 56
End: 2021-03-23
Payer: MEDICARE

## 2021-03-23 DIAGNOSIS — Z76.82 AWAITING ORGAN TRANSPLANT STATUS: ICD-10-CM

## 2021-04-07 ENCOUNTER — IMMUNIZATION (OUTPATIENT)
Dept: INTERNAL MEDICINE | Facility: CLINIC | Age: 56
End: 2021-04-07
Payer: MEDICARE

## 2021-04-07 DIAGNOSIS — Z23 NEED FOR VACCINATION: Primary | ICD-10-CM

## 2021-04-07 PROCEDURE — 91300 COVID-19, MRNA, LNP-S, PF, 30 MCG/0.3 ML DOSE VACCINE: CPT | Mod: S$GLB,,, | Performed by: FAMILY MEDICINE

## 2021-04-07 PROCEDURE — 91300 COVID-19, MRNA, LNP-S, PF, 30 MCG/0.3 ML DOSE VACCINE: ICD-10-PCS | Mod: S$GLB,,, | Performed by: FAMILY MEDICINE

## 2021-04-07 PROCEDURE — 0001A COVID-19, MRNA, LNP-S, PF, 30 MCG/0.3 ML DOSE VACCINE: ICD-10-PCS | Mod: CV19,S$GLB,, | Performed by: FAMILY MEDICINE

## 2021-04-07 PROCEDURE — 0001A COVID-19, MRNA, LNP-S, PF, 30 MCG/0.3 ML DOSE VACCINE: CPT | Mod: CV19,S$GLB,, | Performed by: FAMILY MEDICINE

## 2021-04-28 ENCOUNTER — IMMUNIZATION (OUTPATIENT)
Dept: INTERNAL MEDICINE | Facility: CLINIC | Age: 56
End: 2021-04-28
Payer: MEDICARE

## 2021-04-28 DIAGNOSIS — Z23 NEED FOR VACCINATION: Primary | ICD-10-CM

## 2021-04-28 PROCEDURE — 0002A COVID-19, MRNA, LNP-S, PF, 30 MCG/0.3 ML DOSE VACCINE: CPT | Mod: PBBFAC | Performed by: FAMILY MEDICINE

## 2021-04-28 PROCEDURE — 91300 COVID-19, MRNA, LNP-S, PF, 30 MCG/0.3 ML DOSE VACCINE: CPT | Mod: PBBFAC | Performed by: FAMILY MEDICINE

## 2022-05-11 ENCOUNTER — PATIENT MESSAGE (OUTPATIENT)
Dept: RESEARCH | Facility: CLINIC | Age: 57
End: 2022-05-11
Payer: MEDICARE

## 2023-01-20 NOTE — PROGRESS NOTES
Continue follow upo with cardiology January 20, 2023       Alex Barker MD  [address]  [facility] [address]  Via In Basket      Patient: Dami Arana   YOB: 1960   Date of Visit: 1/20/2023       Dear Dr. Barker:    I saw your patient, Dami Arana, for an evaluation. Below are my notes for this visit with him.    If you have questions, please do not hesitate to call me.          Sincerely,        Thierno Cordova MD        CC: No Recipients  Thierno Cordova MD  1/20/2023  2:45 PM  Signed  Thierno Cordova MD  [facility]  [facility]  Office: [phone]    New Patient Visit  1/20/2023    Chief Complaint:   Right shoulder pain    Diagnosis:   Right rotator cuff tear    Date of Injury:   October 2022    Referring Provider:   Dr. Barker    History:  The patient is 62 year old years old.  Patient presents to clinic for evaluation of right shoulder pain.  Patient states that he has been having right shoulder pain for many years but was clearly worsened back in October when he tripped and fell on a sidewalk in [place].  He had worsening pain of his right shoulder weakness after the fall.  He has been trying physical therapy that has not helped to improve his pain.  He denies any numbness or tingling in the right shoulder.  His pain is located in the lateral and anterior shoulder and worse with overhead movement and lifting.  He has no strength in the shoulder at this time.    Past Medical History:   Diagnosis Date   • Anxiety 8/7/2018   • Encounter for long-term (current) use of medications 8/7/2018   • Essential hypertension 8/7/2018   • Gout 8/7/2018   • Liver enzyme elevation 8/7/2018   • KEYES (nonalcoholic steatohepatitis) 8/7/2018   • Type 2 diabetes mellitus with hyperglycemia, without long-term current use of insulin (CMS/HCC) 8/7/2018       No past surgical history on file.    Family History   Problem Relation Age of Onset   • Heart Mother    • Stroke Mother     • Stroke Father    • Diabetes Brother    • Bipolar disorder Brother    • Heart Maternal Grandmother        Social History     Tobacco Use   • Smoking status: Never   • Smokeless tobacco: Never   Substance Use Topics   • Alcohol use: Yes     Comment: 6 beers weekly   • Drug use: No       Current Outpatient Medications   Medication Sig Dispense Refill   • tiZANidine (ZANAFLEX) 2 MG tablet Take 1 tablet by mouth 3 times daily as needed for Muscle spasms. 30 tablet 0   • Cholecalciferol (Vitamin D) 50 mcg (2,000 units) tablet Take 1 tablet by mouth daily.     • metFORMIN (GLUCOPHAGE-XR) 500 MG 24 hr tablet Take 1 tablet by mouth daily. 360 tablet 3   • carvedilol (COREG CR) 40 MG 24 hr capsule Take 1 capsule by mouth once daily 30 capsule 0   • allopurinol (ZYLOPRIM) 100 MG tablet Take 1 tablet by mouth once daily 30 tablet 0   • diclofenac (VOLTAREN) 75 MG EC tablet Take 1 tablet by mouth in the morning and 1 tablet in the evening. 60 tablet 0   • tamsulosin (FLOMAX) 0.4 MG Cap TAKE 1 CAPSULE BY MOUTH ONCE DAILY AFTER A MEAL 90 capsule 3   • ALPRAZolam (XANAX) 0.25 MG tablet TAKE 1 TO 2 TABLETS BY MOUTH THREE TIMES DAILY AS NEEDED FOR ANXIETY 90 tablet 5   • olmesartan (BENICAR) 20 MG tablet Take 1 tablet by mouth once daily 90 tablet 3   • tadalafil (CIALIS) 5 MG tablet TAKE 1 TABLET BY MOUTH AS NEEDED FOR ERECTILE DYSFUNCTION 30 tablet 5   • Semaglutide, 2 MG/DOSE, (Ozempic, 2 MG/DOSE,) 8 MG/3ML Solution Pen-injector Inject 2 mg into the skin 1 day a week. 9 mL 3   • pioglitazone (ACTOS) 15 MG tablet Take 1 tablet by mouth daily. 90 tablet 3   • atorvastatin (LIPITOR) 20 MG tablet Take 1 tablet by mouth nightly. 90 tablet 3   • Insulin Pen Needle (BD Pen Needle Casandra U/F) 32G X 4 MM Misc Use to inject insulin 1 time daily. Remove needle cover(s) to expose needle before injecting. 100 each 3   • Tresiba FlexTouch 100 UNIT/ML pen-injector Inject 60 UNITS SUBCUTANEOUSLY DAILY. PRIME 2 UNITS BEFORE EACH DOSE. 60 mL 3    • hydrochlorothiazide (MICROZIDE) 12.5 MG capsule Take 1 capsule by mouth once daily 90 capsule 5   • aspirin (ECOTRIN) 81 MG EC tablet Take 1 tablet by mouth daily.     • Blood Glucose Monitoring Suppl (OneTouch Verio Flex System) w/Device Kit 1 kit by Other route as directed. Used to check blood sugar as directed by Dr Davis 1 kit 0   • blood glucose test strip Test blood sugar 2 times daily as directed. Diagnosis:E11.65. Meter: Onetouch Verio or whatever is covered by insurance 200 strip 1   • blood glucose lancets Test blood sugar 2 times daily as directed. Diagnosis: E11.65. Meter: Onetouch Verio or whatever is covered by insurance 200 each 1   • Multiple Vitamins-Minerals (MULTIVITAMIN ADULT) Tab Take 1 tablet by mouth daily.       No current facility-administered medications for this visit.       ALLERGIES:  Empagliflozin    Review of Systems   All other systems reviewed and are negative.      Exam:  Extremity:  Active Shoulder Range of Motion:  Right:  For elevation no 150°, external rotation to 25°, internal rotation to T12  Left:  For elevation no 170 external rotation 45°, internal rotation to T5   The symptomatic right shoulder has positive Neer and Becerril signs. Dorie sign is positive on the symptomatic side and neck on the contralateral side. ER strength is weak on the symptomatic side and normal on the contralateral side.  Positive tenderness to palpation of bicipital groove.  Positive Yergason's and speed's test.  No AC, SC, cross body adduction, scars, atrophy, deformity, belly press, lift off, external rotation lag sign, internal rotation lag sign, Hornblower's bilaterally. Gait symmetric, steady. Fingers well perfused and normal. Capillary refill less than 2 seconds. Neurovascular exam, cervical spine exam, trunk and skin are normal.    Diagnostic Imaging Interpretation:  X-rays of the right shoulder from patient's primary care clinic were independently reviewed with patient show no acute  fractures or dislocations.  Mild glenohumeral osteoarthritis with no signs of superior migration the humeral head    MRI of the right shoulder from 2023 was independently reviewed with the patient and show a massive complete tear of the rotator cuff involving the supra and infraspinatus tendons with retraction to the medial humeral head.  There is grade 1 fatty atrophy of the supraspinatus muscle and grade 2 fatty atrophy of the infraspinatus muscle.  There is degenerative tearing of the biceps tendon within the bicipital groove.    Impression:  62 year old right hand-dominant Male/Female with right full thickness rotator cuff tear    Plan:  I discussed the treatment options with Dami includin) Living with the symptoms  2) Continued non-operative management  3) Surgical intervention.    We reviewed the risks and benefits of right shoulder arthroscopy, subacromial decompression, rotator cuff repair and related procedures including but not limited to the following:     Risk of anesthesia, including death; infection; nerve/tendon/vessel injury; deep venous thrombosis (DVT), pulmonary embolism (PE); shoulder stiffness, possible need to treat the biceps tendon including but not limited tenotomy (cutting the tendon) or open subpectoral biceps tenodesis (securing the tendon into a bone socket in the humerus through a small open incision next to the armpit), possible need to address the acromioclavicular joint (AC joint); rotator cuff repair non-healing or re-tear; hardware related problems; potential of rotor cuff repair irreparability (not able to repair the torn tendons), potential of the procedure to not alleviate the condition; and the potential need for further surgery in the future. We also discussed the possible use of biologic augmentation with a collagen scaffold or an allograft dermal (skin) graft depending on the tissue quality, tear size, and intraoperative determination of healing  potential.    After going over these risks, benefits, and alternatives Dami would like to proceed with surgery. All of his questions were answered satisfactorily and he signed the surgical consent form to proceed. All appropriate paperwork was completed and our surgery scheduler will call you to set up a date for surgery. Our bracing company will then call you to set up sling and cold therapy. The post-operative pain management prescription will be sent to your pharmacy prior to surgery.    I had a clear conversation with Dami about post-op pain management and use of opioids specifically. We will be providing a multi-modal combination of medications designed to limit the use of opioids. The opioid will be used for short term post-op pain management only and should be used cautiously. I discussed the risks associated with the highly addictive nature of opioids (physical or psychological dependence), even when taken as prescribed, as well as over-dosage. I  also  reviewed the dangers of opioids when taken alone or in combination with  alcohol, benzodiazepines, sleep medications (prescription or over the counter), or other central nervous system depressants. Risks including but not limited to fatal respiratory depression and impaired judgement were discussed. I gave specific instructions not to drive while taking narcotic pain medication.     At the conclusion of the visit, Dami verbally acknowledged that all questions were answered satisfactorily.    Thierno Cordova MD  1/20/2023    Procedure/CPT:  79432 - Shoulder Arthroscopy w/ Debridement right   72168 - Shoulder Arthroscopy w/ RTC Repair right  49665 - Shoulder Arthroscopy w/ Biceps Tenodesis right     Hospital:  [facility]  Anesthesia:  General and Interscalene block  Length of Stay:  Outpatient Surgery     Duration of Procedure:  2 hrs  Equipment:  Beach chair, arthrex anchors, open biceps tenodesis anchors  Need OR Surgical Assist:   Yes    Surgeon/Patient's preferred date: per patient    Please coordinate:     - Prehab: Yes ( to send Staff Message to Rehab Reception Pool)  - Postop PT: Yes:  Start P6 weeks post op of scheduled surgery ( to send staff message to Rehab Recept Pool)  - Post-Op Equipment (ex:  CPM/Cold Therapy, Brace, etc): abduction sling, cold therapy    - Preop H&P / Testing to be done by: PCP  - Preop Follow-up appt with Surgeon:  No    - First postop visit at 10-14 days  days postop with MD.

## 2024-04-04 ENCOUNTER — CLINICAL SUPPORT (OUTPATIENT)
Dept: AUDIOLOGY | Facility: CLINIC | Age: 59
End: 2024-04-04
Payer: MEDICARE

## 2024-04-04 ENCOUNTER — OFFICE VISIT (OUTPATIENT)
Dept: OTOLARYNGOLOGY | Facility: CLINIC | Age: 59
End: 2024-04-04
Payer: MEDICARE

## 2024-04-04 VITALS — WEIGHT: 155 LBS | BODY MASS INDEX: 23.49 KG/M2 | HEIGHT: 68 IN | TEMPERATURE: 98 F

## 2024-04-04 DIAGNOSIS — H61.23 BILATERAL IMPACTED CERUMEN: Primary | ICD-10-CM

## 2024-04-04 PROCEDURE — 69210 REMOVE IMPACTED EAR WAX UNI: CPT | Mod: S$GLB,,, | Performed by: OTOLARYNGOLOGY

## 2024-04-04 PROCEDURE — 99499 UNLISTED E&M SERVICE: CPT | Mod: 25,S$GLB,, | Performed by: OTOLARYNGOLOGY

## 2024-04-04 PROCEDURE — 99999 PR PBB SHADOW E&M-EST. PATIENT-LVL III: CPT | Mod: PBBFAC,,, | Performed by: OTOLARYNGOLOGY

## 2024-04-04 NOTE — PROGRESS NOTES
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 No tinnitus. No aural pressure, fullness, pain or drainage. No history of otologic surgery. 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 Not consistent with HPD.     Otoscopy revealed bilateral cerumen impaction. Patient opted to reschedule audiogram after seeing ENT rather than wait today.

## 2024-04-04 NOTE — PROGRESS NOTES
REFERRING PROVIDER  Self, Aaareferral  No address on file  Subjective:   Patient: Erasto Low [id number], :1965   Visit date:2024 2:39 PM    Chief Complaint:  Hearing Loss (Ear cleaning)        HPI:     Erasto Low is a 58 y.o. male whom I am asked to see for evaluation of otalgia or hearing loss in both ears for the past 1-4 weeks.   Erasto rates the severity as moderate.  No exacerbating or relieving factors.  There is no drainage from the ears.      His meds, allergies, medical, surgical, social & family histories were reviewed & updated:  -     He has a current medication list which includes the following prescription(s): acetaminophen, amlodipine, clonazepam, multivitamin, and multivitamin with folic acid.  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   -     He does not have any pertinent problems on file.   -     He  has a past surgical history that includes Kidney transplant; Kidney transplant; Hip Arthroplasty; Total shoulder arthroplasty; Nephrectomy (); AV fistula placement; Parathyroidectomy; Hernia repair; Lung removal, partial; Kidney surgery; Prostatectomy (); and Colonoscopy (N/A, 10/8/2020).  -     He  reports that he has never smoked. His smokeless tobacco use includes snuff. He reports current alcohol use. He reports that he does not use drugs.  -     His family history includes Hypertension in his brother, father, maternal uncle, and mother; Kidney disease in his brother, maternal uncle, and mother.  -     He is allergic to ether, cyclosporine, and ether.      Review of Systems:  -     Allergic/Immunologic: is allergic to ether, cyclosporine, and ether..  -      "Constitutional: Current temp: 98.1 °F (36.7 °C) (Temporal)        Objective:     Physical Exam:  Vitals:  Temp 98.1 °F (36.7 °C) (Temporal)   Ht 5' 8" (1.727 m)   Wt 70.3 kg (154 lb 15.7 oz)   BMI 23.57 kg/m²   Communication:  Able to communicate, no hoarseness.  Head & Face:  Normocephalic, atraumatic, no sinus tenderness.  Eyes:  Extraocular motions intact.  Ears:  Otoscopy of external auditory canals reveals impaction of bilateral ear canals.  With the patient in the supine position, we used the operating microscope to examine both ears with the appropriate sized ear speculum.  A variety of sterile, micro-instruments were utilized to remove the cerumen atraumatically from the impacted ear(s).   After removal, the ears were reexamined-  Right Ear:  No mass/lesion of auricle. The external auditory canals is without erythema or discharge. Pneumatic otoscopy of the tympanic membrane revealed no perforation and good mobility, with no fluid in middle ear. Clinical speech reception thresholds grossly normal.  Left Ear:  No mass/lesion of auricle. The external auditory canals is without erythema or discharge. Pneumatic otoscopy of the tympanic membrane revealed no perforation and good mobility, with no fluid in middle ear. Clinical speech reception thresholds grossly normal  Nose:  No masses/lesions of external nose, nasal mucosa, septum, and turbinates were within normal limits.  Mouth:  No mass/lesion of lips, teeth, gums, hard/soft palate, tongue, tonsils, or oropharynx.  Neck & Lymphatics:  No cervical lymphadenopathy, no neck mass/crepitus/ asymmetry, trachea is midline, no thyroid enlargement/tenderness/mass.  Neuro/Psych: Alert with normal mood and affect.   Respiration/Chest:  Symmetric expansion during respiration, normal respiratory effort.  Skin:  Warm and intact.    Assessment & Plan:       -     Cerumen Impaction - Erasto has cerumen impaction.  We discussed preventative measures and treatment options.  " Q-tips must be avoided, instead the ears can be cleaned with OTC ear rinses (or a mixture of alcohol & vinegar in equal parts).   For hard wax, Erasto may place mineral oil/baby oil in the ear with a cotton ball at night and remove in the shower.  This will assist in softening the wax and allow it to drain out on its own. If the cerumen impacts the ear canal and causes hearing loss or infection he needs to follow-up in the clinic for treatment and cleaning.

## 2024-04-16 ENCOUNTER — CLINICAL SUPPORT (OUTPATIENT)
Dept: AUDIOLOGY | Facility: CLINIC | Age: 59
End: 2024-04-16
Payer: MEDICARE

## 2024-04-16 DIAGNOSIS — H90.3 SENSORINEURAL HEARING LOSS, ASYMMETRICAL: Primary | ICD-10-CM

## 2024-04-16 PROCEDURE — 92567 TYMPANOMETRY: CPT | Mod: S$GLB,,, | Performed by: AUDIOLOGIST-HEARING AID FITTER

## 2024-04-16 PROCEDURE — 92557 COMPREHENSIVE HEARING TEST: CPT | Mod: S$GLB,,, | Performed by: AUDIOLOGIST-HEARING AID FITTER

## 2024-04-16 NOTE — PROGRESS NOTES
Referring provider: Dr. Brittney Brannonam was seen 04/16/2024 for an audiological evaluation.  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 No tinnitus. No aural pressure, fullness, pain or drainage. No history of otologic surgery. 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 Not consistent with HPD. . Runs and trains dogs, hard to hear judges.     Results reveal a moderate-to-moderately severe sensorineural hearing loss 250-8000 Hz for the right ear, and a moderate-to-severe sensorineural hearing loss 250-8000 Hz for the left ear.   Speech Reception Thresholds were 50 dBHL for the right ear and 50 dBHL for the left ear.   Word recognition scores were fair for the right ear and fair for the left ear.   Tympanograms were Type A for the right ear and Type A for the left ear.    Patient was counseled on the above findings.    Recommendations:  Annual audiogram to monitor asymmetry in hearing.  Hearing aids, binaural. Patient is provided a copy of his audiogram and hearing aid information packet. He will also check into his [employer] plan.

## 2024-05-14 ENCOUNTER — CLINICAL SUPPORT (OUTPATIENT)
Dept: AUDIOLOGY | Facility: CLINIC | Age: 59
End: 2024-05-14
Payer: MEDICARE

## 2024-05-14 DIAGNOSIS — H90.3 SENSORINEURAL HEARING LOSS, BILATERAL: Primary | ICD-10-CM

## 2024-05-14 NOTE — PROGRESS NOTES
Erasto Low was seen 05/14/2024 for a hearing aid consult to discuss hearing aids. He is accompanied by his step-father. Reviewed styles, features and realistic expectations in detail. Discussed [facility] policy and explained he is responsible to self-file. Suggested they check benefit using [employer] tax id. They declined stating they want to proceed with [facility] regardless of benefit.   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 Explained hearing aids are not be be used with firearm use. Discussed SoundGear Phantom. Patient is in variety of competitive noise situations and is outdoors often. For that reason, I recommend Phonak Lumity Premium level PHONG. Patient agrees with this plan. [employer] remote reuben to his iPhone.     The following aids will be ordered:    A pair of Phonak Audeo L90-RL  Color: P3  Speaker Units:  #1M R/L   Domes:  Titanium shell    Hearing aid deposit was PIF and hearing aid fitting appointment was scheduled.

## 2024-06-04 ENCOUNTER — CLINICAL SUPPORT (OUTPATIENT)
Dept: AUDIOLOGY | Facility: CLINIC | Age: 59
End: 2024-06-04
Payer: MEDICARE

## 2024-06-04 DIAGNOSIS — H90.3 SENSORINEURAL HEARING LOSS, BILATERAL: Primary | ICD-10-CM

## 2024-06-04 NOTE — PROGRESS NOTES
Erasto Low was seen 06/04/2024 for hearing aid fitting.  He is a new user.  Settings were set to 85% target based on current audiogram.  Practiced On-board VC. Patient was counseled on insertion, cleaning and maintenance and warranties. Patient was able to manipulate hearing aids well and reported satisfaction with sound quality to date.  Patient was counseled on realistic expectations and acclimation strategies.  Patient was given a copy of the hearing aid purchase agreement and will pay in full today.  Patient's one month trail period will begin today. Patient has been scheduled for a hearing aid follow up in two week(s).  Patient deferred pairing to iPhone today.    OPTION: 2   Bundled      :   [employer]   Model:    viavooeTepha L90-RL  Color:   [place]  Right aid sn#:  [id number]  Left aid sn#:  [id number]  Speaker link:  #1 M R/L  Right Titanium cShell: [id number] ACC: [id number] Warranty: 9/20/24  Left Titanium cShell: [id number] ACC: [id number]  Warranty: 9/20/24  Battery:   Rechargeable   sn#:  [id number]  Case Go  Repair warranty: 06/22/2027  L/D warranty:  06/22/2027  Trial ends:   07/05/2024

## 2024-06-07 ENCOUNTER — CLINICAL SUPPORT (OUTPATIENT)
Dept: AUDIOLOGY | Facility: CLINIC | Age: 59
End: 2024-06-07
Payer: MEDICARE

## 2024-06-07 ENCOUNTER — TELEPHONE (OUTPATIENT)
Dept: AUDIOLOGY | Facility: CLINIC | Age: 59
End: 2024-06-07

## 2024-06-07 DIAGNOSIS — H90.3 SENSORINEURAL HEARING LOSS OF BOTH EARS: Primary | ICD-10-CM

## 2024-06-07 NOTE — PROGRESS NOTES
"Erasto Low was seen 06/07/2024 for hearing aid adjustment.  Cerustop was missing from left aid. Patient suspects maybe fell off when cleaning yesterday. Otoscopy was normal.   He complains of static sounds from both aids, not persistent. Hearing aid check reveals both aids are in GWO. Based on patient's description, I suspect he is either hearing circuit noise or very soft-sound amplification. Overall patient is enjoying aids and appreciates benefit. Enjoys hearing environmental sounds better and feels more engaged with the world and reaction time has improved. Speech is still occasionally too soft. Since he overall is tolerating aids well, increased to 90% target. Turned speech enhancer on. For soft sound perception of "static," I decreased gain to very soft sounds and increased noise reduction tab to soft sounds. Set SR2 toward comfort. Also paired his aids to his iPhone for streaming and to [employer] reuben. Reviewed VC, mute and quick tabs in Home screen and Adjust Program screen. Advised to wait on further tuning adjustments since aids are still so new. Patient agrees with this plan. He very much appreciated streaming and reuben features. He will followup in 1-2 weeks.    Summary of adjustments:  90% target  Speech enhancer on  Soft sound reduction tab increased  Very soft sound gain reduced X2  SR2 X1 toward comfort  On-board VC active  Paired phone streaming and [employer] remote reuben      "

## 2024-06-07 NOTE — TELEPHONE ENCOUNTER
Right aid not connecting to reuben but everything else works. Advised to reboot aids and phone. Also instructed how to remove devices and re-pair. Gave [employer] customer support number. Instructional videos available online as well. He will trial my suggestions. Appreciation was voiced. //[email or web address]//    ----- Message from Evie Orozco sent at 6/7/2024  3:54 PM CDT -----  Contact: Erasto Schuster is calling in regards to the PHONak is saying that its connect but the reuben is saying its not connect.please call back at .[phone]          Thanks  LUIZA

## 2024-06-18 ENCOUNTER — CLINICAL SUPPORT (OUTPATIENT)
Dept: AUDIOLOGY | Facility: CLINIC | Age: 59
End: 2024-06-18
Payer: MEDICARE

## 2024-06-18 DIAGNOSIS — H90.3 SENSORINEURAL HEARING LOSS OF BOTH EARS: Primary | ICD-10-CM
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
## 2024-07-02 ENCOUNTER — CLINICAL SUPPORT (OUTPATIENT)
Dept: AUDIOLOGY | Facility: CLINIC | Age: 59
End: 2024-07-02
Payer: MEDICARE

## 2024-07-02 DIAGNOSIS — H90.3 SENSORINEURAL HEARING LOSS OF BOTH EARS: Primary | ICD-10-CM

## 2024-07-02 NOTE — PROGRESS NOTES
Erasto Low was seen 07/02/2024 for hearing aid follow-up.  Patient reports he is doing well. Some difficulty hearing conversations across table (group of 7) in noisy restaurant. Discussed communication strategies and realistic expectations. Adjusted aids X1 (gain, CR) for background noise too loud. Increased to 100% target. Reviewed reuben feature and D-angel settins in reuben. Reviewed how to re-establish BT connection on his iPhone in Settings menu as well as how to manual re-boot HAs and phone. Aided test results today were good. Patient is pleased with aids and is keeping them.     He continues to have difficulty with cerustop falling off left titanium shell. Will send to [facility] for remake. Patient declined temporary speaker/dome today. He is scheduled for surgery in two weeks with limited use of aids during recovery. He will drop off left aid at that time for remake of [employer].

## 2024-07-12 ENCOUNTER — CLINICAL SUPPORT (OUTPATIENT)
Dept: AUDIOLOGY | Facility: CLINIC | Age: 59
End: 2024-07-12
Payer: MEDICARE

## 2024-07-12 DIAGNOSIS — H90.3 SENSORINEURAL HEARING LOSS OF BOTH EARS: Primary | ICD-10-CM

## 2024-07-12 NOTE — PROGRESS NOTES
Erasto W Devante was seen 07/12/2024 for hearing aid repair delivery. Will send left titanium cshell for remake due to loose grommet and cerustop falling out. Patient is fitted with temporary 1M 5.0 SDS speaker with a small power dome. I will call him when new shell is back. If he is unable to come into office (if still recovering from his upcoming surgery), explained anybody can bring aid to me for  sway.

## 2024-07-16 ENCOUNTER — HOSPITAL ENCOUNTER (OUTPATIENT)
Dept: RADIOLOGY | Facility: HOSPITAL | Age: 59
Discharge: HOME | End: 2024-07-16
Attending: NURSE PRACTITIONER
Payer: MEDICARE

## 2024-07-16 ENCOUNTER — OFFICE VISIT (OUTPATIENT)
Dept: FAMILY MEDICINE | Facility: CLINIC | Age: 59
End: 2024-07-16
Payer: MEDICARE

## 2024-07-16 VITALS
WEIGHT: 156 LBS | BODY MASS INDEX: 23.64 KG/M2 | DIASTOLIC BLOOD PRESSURE: 80 MMHG | SYSTOLIC BLOOD PRESSURE: 122 MMHG | HEART RATE: 74 BPM | TEMPERATURE: 98 F | OXYGEN SATURATION: 99 % | HEIGHT: 68 IN

## 2024-07-16 DIAGNOSIS — Z01.818 PREOPERATIVE CLEARANCE: Primary | ICD-10-CM

## 2024-07-16 DIAGNOSIS — R79.1 ABNORMAL COAGULATION PROFILE: ICD-10-CM

## 2024-07-16 DIAGNOSIS — Z01.818 PREOPERATIVE CLEARANCE: ICD-10-CM

## 2024-07-16 DIAGNOSIS — I12.9 RENAL HYPERTENSION: Chronic | ICD-10-CM

## 2024-07-16 DIAGNOSIS — Z99.2 DEPENDENCE ON RENAL DIALYSIS: ICD-10-CM

## 2024-07-16 DIAGNOSIS — Z99.2 ANEMIA IN CHRONIC KIDNEY DISEASE, ON CHRONIC DIALYSIS: Chronic | ICD-10-CM

## 2024-07-16 DIAGNOSIS — N18.6 ANEMIA IN CHRONIC KIDNEY DISEASE, ON CHRONIC DIALYSIS: Chronic | ICD-10-CM

## 2024-07-16 DIAGNOSIS — D63.1 ANEMIA IN CHRONIC KIDNEY DISEASE, ON CHRONIC DIALYSIS: Chronic | ICD-10-CM

## 2024-07-16 LAB
OHS QRS DURATION: 86 MS
OHS QTC CALCULATION: 450 MS

## 2024-07-16 PROCEDURE — 3008F BODY MASS INDEX DOCD: CPT | Mod: CPTII,S$GLB,, | Performed by: NURSE PRACTITIONER

## 2024-07-16 PROCEDURE — 1160F RVW MEDS BY RX/DR IN RCRD: CPT | Mod: CPTII,S$GLB,, | Performed by: NURSE PRACTITIONER

## 2024-07-16 PROCEDURE — 3079F DIAST BP 80-89 MM HG: CPT | Mod: CPTII,S$GLB,, | Performed by: NURSE PRACTITIONER

## 2024-07-16 PROCEDURE — 93005 ELECTROCARDIOGRAM TRACING: CPT | Mod: S$GLB,,, | Performed by: NURSE PRACTITIONER

## 2024-07-16 PROCEDURE — 93010 ELECTROCARDIOGRAM REPORT: CPT | Mod: S$GLB,,, | Performed by: INTERNAL MEDICINE

## 2024-07-16 PROCEDURE — 99214 OFFICE O/P EST MOD 30 MIN: CPT | Mod: S$GLB,,, | Performed by: NURSE PRACTITIONER

## 2024-07-16 PROCEDURE — 1159F MED LIST DOCD IN RCRD: CPT | Mod: CPTII,S$GLB,, | Performed by: NURSE PRACTITIONER

## 2024-07-16 PROCEDURE — 71046 X-RAY EXAM CHEST 2 VIEWS: CPT | Mod: TC,PO

## 2024-07-16 PROCEDURE — 71046 X-RAY EXAM CHEST 2 VIEWS: CPT | Mod: 26,,, | Performed by: RADIOLOGY

## 2024-07-16 PROCEDURE — 99999 PR PBB SHADOW E&M-EST. PATIENT-LVL III: CPT | Mod: PBBFAC,,, | Performed by: NURSE PRACTITIONER

## 2024-07-16 PROCEDURE — 3074F SYST BP LT 130 MM HG: CPT | Mod: CPTII,S$GLB,, | Performed by: NURSE PRACTITIONER

## 2024-07-16 NOTE — PROGRESS NOTES
Subjective:       Patient ID: Erasto Low is a 58 y.o. male.    Chief Complaint: Pre-op Exam (Right knee arthoscopy on Thursday)  Pt reports to clinic for pre op clearance.  Scheduled for right knee arthoscopy.  Non smoker, no ETOH use, no daily anticoagulant use.  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      clonazePAM (KLONOPIN) 0.5 MG tablet Take 0.5 mg by mouth.      multivitamin capsule Take 1 capsule by mouth once daily.      multivitamin with folic acid 400 mcg Tab Take 1 tablet by mouth.      [DISCONTINUED] amLODIPine (NORVASC) 5 MG tablet Take 5 mg by mouth.       No current facility-administered medications on file prior to visit.      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, on RRT since 10/1984    Alport syndrome    Renal hypertension    Osteoporosis, unspecified    Anemia in chronic renal disease    Renal dialysis status(V45.11)    Failed kidney transplant X2 ( #1 LRD/mother 10/1984 and #2  donor 2001-2005)    Awaiting organ transplant    Special screening for malignant neoplasms, colon    Colon cancer screening    Diverticulosis of colon (without mention of hemorrhage)    Benign neoplasm of colon    Fever    Bacteremia due to Staphylococcus    Endocarditis    Bacteremia due to Streptococcus    Patient on waiting list for kidney transplant    History of colon polyps    Colon polyps    H/O radical prostatectomy       Past Surgical History:   Procedure Laterality Date    AV FISTULA PLACEMENT      left upper arm    COLONOSCOPY N/A 10/8/2020    Procedure: COLONOSCOPY dialysis patient;  Surgeon: Baltazar Swift MD;  [facility];  Service: Endoscopy;  Laterality: N/A;    HERNIA REPAIR      HIP ARTHROPLASTY      left    KIDNEY SURGERY       as a child due to reflux    KIDNEY TRANSPLANT      KIDNEY TRANSPLANT      LUNG REMOVAL, PARTIAL      NEPHRECTOMY  2011    left native- benign mass    PARATHYROIDECTOMY      forearm implant    PROSTATECTOMY  2017    TOTAL SHOULDER ARTHROPLASTY      right      HPI  Review of Systems   Constitutional: Negative.    HENT: Negative.     Respiratory: Negative.     Cardiovascular: Negative.    Gastrointestinal: Negative.    Musculoskeletal:  Positive for arthralgias.   Skin: Negative.    Neurological: Negative.    Psychiatric/Behavioral: Negative.         Objective:      Physical Exam  Vitals reviewed.   Constitutional:       Appearance: He is well-developed.   HENT:      Head: Normocephalic.   Eyes:      Pupils: Pupils are equal, round, and reactive to light.   Cardiovascular:      Rate and Rhythm: Normal rate and regular rhythm.      Heart sounds: Normal heart sounds.   Pulmonary:      Effort: No respiratory distress.      Breath sounds: Normal breath sounds.   Abdominal:      General: Bowel sounds are normal.      Palpations: Abdomen is soft.   Musculoskeletal:         General: Normal range of motion.      Cervical back: Normal range of motion.   Skin:     General: Skin is warm and dry.   Neurological:      Mental Status: He is alert and oriented to person, place, and time.         Assessment:       1. Preoperative clearance    2. Abnormal coagulation profile    3. Renal hypertension    4. Renal dialysis status(V45.11)    5. Anemia in chronic kidney disease, on chronic dialysis        Plan:   Preoperative clearance  -     CBC Auto Differential; Future; Expected date: 07/16/2024  -      Comprehensive Metabolic Panel; Future; Expected date: 07/16/2024  -     IN OFFICE EKG 12-LEAD (to Muse)  -     X-Ray Chest PA And Lateral; Future; Expected date: 07/16/2024  -     APTT; Future; Expected date: 07/16/2024  -     IN OFFICE EKG 12-LEAD (to Muse)    Abnormal coagulation profile  -     APTT; Future; Expected date: 07/16/2024    Renal hypertension    Renal dialysis status(V45.11)    Anemia in chronic kidney disease, on chronic dialysis    Clearance pending lab results.  Pt instructed to obtain renal clearance from nephrology before proceeding    No follow-ups on file.

## 2024-07-17 ENCOUNTER — TELEPHONE (OUTPATIENT)
Dept: FAMILY MEDICINE | Facility: CLINIC | Age: 59
End: 2024-07-17
Payer: MEDICARE

## 2024-07-17 NOTE — TELEPHONE ENCOUNTER
----- Message from Remedios Gambino sent at 7/17/2024  2:47 PM CDT -----  Contact: Barbara/Dr. Shaq Lockett  Type:  Patient Returning Call    Who Called: Mariela arceo/Dr. Orellana office  Who Left Message for Patient:   Does the patient know what this is regarding?:   Would the patient rather a call back or a response via [facility]?   Best Call Back Number:  -Additional Information:   call her back at [phone] (pt is having surgery in am)  please call asap   -

## 2024-07-17 NOTE — TELEPHONE ENCOUNTER
Spoke to the nurse at Dr Orellana office and she stated that she never got the pre op and I verified the fax number but it was the phone number so re faxed the pre op. They said that they did receive the pre op.

## 2024-08-02 ENCOUNTER — CLINICAL SUPPORT (OUTPATIENT)
Dept: AUDIOLOGY | Facility: CLINIC | Age: 59
End: 2024-08-02
Payer: MEDICARE

## 2024-08-02 DIAGNOSIS — H90.3 SENSORINEURAL HEARING LOSS OF BOTH EARS: Primary | ICD-10-CM

## 2024-08-02 NOTE — PROGRESS NOTES
Erasto Low was seen 08/02/2024 for hearing aid cShell repair delivery.  Left cShell was remade due to cerustop falling out and slipping out ear. Good fit and secure cerustop. Re-ran feedback test with new left cshell. Patient is pleased. Patient returned [employer] speaker. Reviewed cShell and hearing aid warranties. Recommend: 6-month follow-up.     Patient states received from insurance $500 hearing aid benefit.

## 2024-08-15 ENCOUNTER — TELEPHONE (OUTPATIENT)
Dept: FAMILY MEDICINE | Facility: CLINIC | Age: 59
End: 2024-08-15
Payer: MEDICARE

## 2024-08-15 NOTE — TELEPHONE ENCOUNTER
----- Message from Melinda Harden sent at 8/15/2024  3:12 PM CDT -----  .Type: Orders Request    What orders/ testing are being requested? Blood work    Is there a future appointment scheduled for the patient with PCP? no    When? N/a    Would you prefer a response via [facility] Call back     Comments: Patient need another order for blood work because it is over 30 days for his surgery with Dr. Lockett on 08/29/2024

## 2024-08-15 NOTE — TELEPHONE ENCOUNTER
Spoke to pt and advised that he call his provider to place the lab orders. Once the lab orders are placed then he can come by and have the blood work completed in  the lab.

## 2024-08-15 NOTE — TELEPHONE ENCOUNTER
----- Message from Hortencia Gonzalez sent at 8/15/2024  8:49 AM CDT -----  Contact: Erasto  .Patient is calling to speak with the nurse regarding orders  . Reports needing to retake labs from 7/16 due to the patient surgery was reschedule for a different date  . Please give patient a call back at [phone]

## 2024-08-20 ENCOUNTER — TELEPHONE (OUTPATIENT)
Dept: FAMILY MEDICINE | Facility: CLINIC | Age: 59
End: 2024-08-20
Payer: MEDICARE

## 2024-08-20 DIAGNOSIS — Z01.818 PREOPERATIVE CLEARANCE: Primary | ICD-10-CM

## 2024-08-20 DIAGNOSIS — N18.6 END STAGE RENAL DISEASE: ICD-10-CM

## 2024-08-20 NOTE — TELEPHONE ENCOUNTER
----- Message from Triny Shaikh sent at 8/20/2024  3:47 PM CDT -----  Contact: Erasto  .Type:  Patient Returning Call    Who Called: Erasto   Who Left Message for Patient: nurse   Does the patient know what this is regarding?: yes   Would the patient rather a call back or a response via [facility]?  Call back   Best Call Back Number: .[phone] if no answer please leave a detail message     Additional Information:  Pt would like to know the status of lab orders     Thanks

## 2024-08-20 NOTE — TELEPHONE ENCOUNTER
----- Message from Gaby Anton sent at 8/20/2024  2:23 PM CDT -----  Type : Patient Call      Who Called : Patient      Does the patient know what this is regarding?: Pt is calling in to follow up on the request for new orders faxed over on 8/15 from his outside provider ( Lisette ) ; pt states he's needing new blood work orders due to his procedure being rescheduled so they are needing new blood work results; pt procedure is scheduled on the 8/29; Please contact pt and inform him if the orders were received; please advise       Would the patient rather a call back or a response via [facility]? Call      Best Call Back Number: [phone]            Additional Information:

## 2024-08-20 NOTE — TELEPHONE ENCOUNTER
Can you please put in new orders for this pt for his labs, he needs CBC, Chem 7, and a new h&p to say he is cleared for surgery. Last saw pt in July but his surgery got moved back to next Thursday the 29th. They are needing new orders due to the other ones from July being over 30 days old.

## 2024-08-20 NOTE — TELEPHONE ENCOUNTER
Called and spoke to pt and he said that Dr Lockett office faxed over the orders for labs but I advised pt that we never received them. I called Dr Orellana office and left a vm for his nurse Lisette to call me back or just fax over the orders.

## 2024-08-20 NOTE — TELEPHONE ENCOUNTER
Tried calling pt but no answer. Was calling to let him know that we did receive the fax for his blood work and I sent the message over to mathew to put orders in.

## 2024-08-21 ENCOUNTER — TELEPHONE (OUTPATIENT)
Dept: FAMILY MEDICINE | Facility: CLINIC | Age: 59
End: 2024-08-21
Payer: MEDICARE

## 2024-08-21 NOTE — TELEPHONE ENCOUNTER
Spoke to pt and told him that the lab orders were received and the orders are in. Pt stated he will come tomorrow to get labs

## 2024-08-21 NOTE — TELEPHONE ENCOUNTER
----- Message from Juan Peguero sent at 8/21/2024 10:51 AM CDT -----  Contact: landen Maurer is requesting a call back to verify if the orders were correct that were put in yesterday for blood work. Please give him a call back at [phone].

## 2024-08-22 ENCOUNTER — LAB VISIT (OUTPATIENT)
Dept: LAB | Facility: HOSPITAL | Age: 59
End: 2024-08-22
Attending: NURSE PRACTITIONER
Payer: MEDICARE

## 2024-08-22 DIAGNOSIS — N18.6 END STAGE RENAL DISEASE: ICD-10-CM

## 2024-08-22 DIAGNOSIS — Z01.818 PREOPERATIVE CLEARANCE: ICD-10-CM

## 2024-08-22 LAB
ALBUMIN SERPL BCP-MCNC: 3.8 G/DL (ref 3.5–5.2)
ALP SERPL-CCNC: 90 U/L (ref 55–135)
ALT SERPL W/O P-5'-P-CCNC: 19 U/L (ref 10–44)
ANION GAP SERPL CALC-SCNC: 17 MMOL/L (ref 8–16)
APTT PPP: 34.1 SEC (ref 21–32)
AST SERPL-CCNC: 24 U/L (ref 10–40)
BASOPHILS # BLD AUTO: 0.09 K/UL (ref 0–0.2)
BASOPHILS NFR BLD: 1.2 % (ref 0–1.9)
BILIRUB SERPL-MCNC: 0.6 MG/DL (ref 0.1–1)
BUN SERPL-MCNC: 54 MG/DL (ref 6–20)
CALCIUM SERPL-MCNC: 9.7 MG/DL (ref 8.7–10.5)
CHLORIDE SERPL-SCNC: 96 MMOL/L (ref 95–110)
CO2 SERPL-SCNC: 25 MMOL/L (ref 23–29)
CREAT SERPL-MCNC: 8.8 MG/DL (ref 0.5–1.4)
DIFFERENTIAL METHOD BLD: ABNORMAL
EOSINOPHIL # BLD AUTO: 0.3 K/UL (ref 0–0.5)
EOSINOPHIL NFR BLD: 4.4 % (ref 0–8)
ERYTHROCYTE [DISTWIDTH] IN BLOOD BY AUTOMATED COUNT: 14.2 % (ref 11.5–14.5)
EST. GFR  (NO RACE VARIABLE): 6.3 ML/MIN/1.73 M^2
GLUCOSE SERPL-MCNC: 109 MG/DL (ref 70–110)
HCT VFR BLD AUTO: 36.1 % (ref 40–54)
HGB BLD-MCNC: 11.4 G/DL (ref 14–18)
IMM GRANULOCYTES # BLD AUTO: 0.05 K/UL (ref 0–0.04)
IMM GRANULOCYTES NFR BLD AUTO: 0.6 % (ref 0–0.5)
LYMPHOCYTES # BLD AUTO: 1.9 K/UL (ref 1–4.8)
LYMPHOCYTES NFR BLD: 24 % (ref 18–48)
MCH RBC QN AUTO: 28.8 PG (ref 27–31)
MCHC RBC AUTO-ENTMCNC: 31.6 G/DL (ref 32–36)
MCV RBC AUTO: 91 FL (ref 82–98)
MONOCYTES # BLD AUTO: 0.9 K/UL (ref 0.3–1)
MONOCYTES NFR BLD: 11.9 % (ref 4–15)
NEUTROPHILS # BLD AUTO: 4.5 K/UL (ref 1.8–7.7)
NEUTROPHILS NFR BLD: 57.9 % (ref 38–73)
NRBC BLD-RTO: 0 /100 WBC
PLATELET # BLD AUTO: 232 K/UL (ref 150–450)
PMV BLD AUTO: 10.8 FL (ref 9.2–12.9)
POTASSIUM SERPL-SCNC: 6.1 MMOL/L (ref 3.5–5.1)
PROT SERPL-MCNC: 7.8 G/DL (ref 6–8.4)
RBC # BLD AUTO: 3.96 M/UL (ref 4.6–6.2)
SODIUM SERPL-SCNC: 138 MMOL/L (ref 136–145)
WBC # BLD AUTO: 7.76 K/UL (ref 3.9–12.7)

## 2024-08-22 PROCEDURE — 80053 COMPREHEN METABOLIC PANEL: CPT | Performed by: NURSE PRACTITIONER

## 2024-08-22 PROCEDURE — 36415 COLL VENOUS BLD VENIPUNCTURE: CPT | Mod: PO | Performed by: NURSE PRACTITIONER

## 2024-08-22 PROCEDURE — 85025 COMPLETE CBC W/AUTO DIFF WBC: CPT | Performed by: NURSE PRACTITIONER

## 2024-08-22 PROCEDURE — 85730 THROMBOPLASTIN TIME PARTIAL: CPT | Performed by: NURSE PRACTITIONER

## 2024-08-27 ENCOUNTER — TELEPHONE (OUTPATIENT)
Dept: FAMILY MEDICINE | Facility: CLINIC | Age: 59
End: 2024-08-27
Payer: MEDICARE

## 2024-08-27 NOTE — TELEPHONE ENCOUNTER
----- Message from Camden Rodriguez sent at 8/27/2024  8:19 AM CDT -----  Contact: Jewell/'s office  Type:  Patient Returning Call    Who Called:Jewell/'s office  Who Left Message for Patient:Heather  Does the patient know what this is regarding?: about patient surgery clearance  Would the patient rather a call back or a response via [facility]?  Call back  Best Call Back Number:[phone]  Additional Information:     Thanks   Am

## 2024-08-28 ENCOUNTER — TELEPHONE (OUTPATIENT)
Dept: FAMILY MEDICINE | Facility: CLINIC | Age: 59
End: 2024-08-28
Payer: MEDICARE

## 2024-08-28 DIAGNOSIS — E87.5 HYPERKALEMIA: Primary | ICD-10-CM

## 2024-08-29 ENCOUNTER — LAB VISIT (OUTPATIENT)
Dept: LAB | Facility: HOSPITAL | Age: 59
End: 2024-08-29
Attending: NURSE PRACTITIONER
Payer: MEDICARE

## 2024-08-29 DIAGNOSIS — E87.5 HYPERKALEMIA: ICD-10-CM

## 2024-08-29 LAB
ALBUMIN SERPL BCP-MCNC: 3.7 G/DL (ref 3.5–5.2)
ALP SERPL-CCNC: 87 U/L (ref 55–135)
ALT SERPL W/O P-5'-P-CCNC: 21 U/L (ref 10–44)
ANION GAP SERPL CALC-SCNC: 15 MMOL/L (ref 8–16)
AST SERPL-CCNC: 25 U/L (ref 10–40)
BASOPHILS # BLD AUTO: 0.08 K/UL (ref 0–0.2)
BASOPHILS NFR BLD: 1.2 % (ref 0–1.9)
BILIRUB SERPL-MCNC: 0.5 MG/DL (ref 0.1–1)
BUN SERPL-MCNC: 48 MG/DL (ref 6–20)
CALCIUM SERPL-MCNC: 9.9 MG/DL (ref 8.7–10.5)
CHLORIDE SERPL-SCNC: 99 MMOL/L (ref 95–110)
CO2 SERPL-SCNC: 28 MMOL/L (ref 23–29)
CREAT SERPL-MCNC: 7.8 MG/DL (ref 0.5–1.4)
DIFFERENTIAL METHOD BLD: ABNORMAL
EOSINOPHIL # BLD AUTO: 0.3 K/UL (ref 0–0.5)
EOSINOPHIL NFR BLD: 4.2 % (ref 0–8)
ERYTHROCYTE [DISTWIDTH] IN BLOOD BY AUTOMATED COUNT: 14.3 % (ref 11.5–14.5)
EST. GFR  (NO RACE VARIABLE): 7.3 ML/MIN/1.73 M^2
GLUCOSE SERPL-MCNC: 131 MG/DL (ref 70–110)
HCT VFR BLD AUTO: 36.5 % (ref 40–54)
HGB BLD-MCNC: 11.5 G/DL (ref 14–18)
IMM GRANULOCYTES # BLD AUTO: 0.04 K/UL (ref 0–0.04)
IMM GRANULOCYTES NFR BLD AUTO: 0.6 % (ref 0–0.5)
LYMPHOCYTES # BLD AUTO: 1.7 K/UL (ref 1–4.8)
LYMPHOCYTES NFR BLD: 24.9 % (ref 18–48)
MCH RBC QN AUTO: 29.4 PG (ref 27–31)
MCHC RBC AUTO-ENTMCNC: 31.5 G/DL (ref 32–36)
MCV RBC AUTO: 93 FL (ref 82–98)
MONOCYTES # BLD AUTO: 0.8 K/UL (ref 0.3–1)
MONOCYTES NFR BLD: 11.8 % (ref 4–15)
NEUTROPHILS # BLD AUTO: 4 K/UL (ref 1.8–7.7)
NEUTROPHILS NFR BLD: 57.3 % (ref 38–73)
NRBC BLD-RTO: 0 /100 WBC
PLATELET # BLD AUTO: 200 K/UL (ref 150–450)
PMV BLD AUTO: 10.7 FL (ref 9.2–12.9)
POTASSIUM SERPL-SCNC: 5.1 MMOL/L (ref 3.5–5.1)
PROT SERPL-MCNC: 7.5 G/DL (ref 6–8.4)
RBC # BLD AUTO: 3.91 M/UL (ref 4.6–6.2)
SODIUM SERPL-SCNC: 142 MMOL/L (ref 136–145)
WBC # BLD AUTO: 6.88 K/UL (ref 3.9–12.7)

## 2024-08-29 PROCEDURE — 80053 COMPREHEN METABOLIC PANEL: CPT | Performed by: NURSE PRACTITIONER

## 2024-08-29 PROCEDURE — 85025 COMPLETE CBC W/AUTO DIFF WBC: CPT | Performed by: NURSE PRACTITIONER

## 2024-08-29 PROCEDURE — 36415 COLL VENOUS BLD VENIPUNCTURE: CPT | Mod: PO | Performed by: NURSE PRACTITIONER

## 2024-09-04 ENCOUNTER — TELEPHONE (OUTPATIENT)
Dept: FAMILY MEDICINE | Facility: CLINIC | Age: 59
End: 2024-09-04
Payer: MEDICARE

## 2024-09-04 NOTE — TELEPHONE ENCOUNTER
----- Message from Mauro Banks sent at 9/4/2024 11:30 AM CDT -----  Contact: [phone]  Type:  Patient Returning Call    Who Called:ADRIEL RBUY [[id number]]  Who Left Message for Patient:  Does the patient know what this is regarding?:need to talk to you before his surgery  Would the patient rather a call back or a response via [facility]? Call back  Best Call Back Number: [phone]  Additional Information: MRN   [id number]

## 2024-09-20 ENCOUNTER — TELEPHONE (OUTPATIENT)
Dept: FAMILY MEDICINE | Facility: CLINIC | Age: 59
End: 2024-09-20
Payer: MEDICARE

## 2024-09-20 NOTE — TELEPHONE ENCOUNTER
----- Message from Jeannette Mckeon NP sent at 9/20/2024 12:12 PM CDT -----  Contact: Sandy(Dr Shaq Lockett)  Schedule an appt with his PCP  ----- Message -----  From: Heather Moon MA  Sent: 9/20/2024  10:41 AM CDT  To: Jeannette Mckeon NP    Pt has to come back in to get a new clearance?  ----- Message -----  From: Milton Crowley  Sent: 9/20/2024  10:37 AM CDT  To: Mike Delgado is calling in regards to she need a clearance for surgery. She has a clearance from July and she need one from the last 30 days . Fax is [phone]

## 2024-09-20 NOTE — TELEPHONE ENCOUNTER
Left vm for pt to let him know that Sandy from Dr Orellana office called and they are needing an updated clearance and he would need to see Dr Rubalcava for the clearance.

## 2024-09-23 ENCOUNTER — TELEPHONE (OUTPATIENT)
Dept: INTERNAL MEDICINE | Facility: CLINIC | Age: 59
End: 2024-09-23
Payer: MEDICARE

## 2024-09-23 ENCOUNTER — TELEPHONE (OUTPATIENT)
Dept: FAMILY MEDICINE | Facility: CLINIC | Age: 59
End: 2024-09-23
Payer: MEDICARE

## 2024-09-23 NOTE — TELEPHONE ENCOUNTER
----- Message from Remedios Maki sent at 9/23/2024 12:33 PM CDT -----  Contact: pt's mom/ nerissa  Type:  Request a callback    Name of Caller: pt's mom/Nerissa  Best Call Back Number: [phone]  Additional Information:  nerissa is calling about the pt's pre op paperwork that was faxed in to the office from Dr. Lockett, please call her about these.  The pt has scheduled with Jeannette Mckeon at [facility]  
Called and spke with mother. Advised her that we have never received a paperwork for this patient. Called and left message with Dr. Shaq nunez's office to fax over paperwork to Jeannette Mckeon's office.     
No

## 2024-09-23 NOTE — TELEPHONE ENCOUNTER
----- Message from Zhane Sotelo sent at 9/23/2024  3:22 PM CDT -----  Calling to see if you got the paperwork from Shaq Lockett for pre op. Please callback [phone] to assist

## 2024-09-24 ENCOUNTER — OFFICE VISIT (OUTPATIENT)
Dept: FAMILY MEDICINE | Facility: CLINIC | Age: 59
End: 2024-09-24
Payer: MEDICARE

## 2024-09-24 ENCOUNTER — LAB VISIT (OUTPATIENT)
Dept: LAB | Facility: HOSPITAL | Age: 59
End: 2024-09-24
Attending: NURSE PRACTITIONER
Payer: MEDICARE

## 2024-09-24 ENCOUNTER — TELEPHONE (OUTPATIENT)
Dept: FAMILY MEDICINE | Facility: CLINIC | Age: 59
End: 2024-09-24
Payer: MEDICARE

## 2024-09-24 VITALS
TEMPERATURE: 98 F | DIASTOLIC BLOOD PRESSURE: 80 MMHG | SYSTOLIC BLOOD PRESSURE: 152 MMHG | BODY MASS INDEX: 23.58 KG/M2 | HEIGHT: 68 IN | OXYGEN SATURATION: 98 % | WEIGHT: 155.56 LBS | HEART RATE: 73 BPM

## 2024-09-24 DIAGNOSIS — Z01.818 PREOPERATIVE CLEARANCE: Primary | ICD-10-CM

## 2024-09-24 DIAGNOSIS — Z01.818 PREOPERATIVE CLEARANCE: ICD-10-CM

## 2024-09-24 DIAGNOSIS — Z99.2 DEPENDENCE ON RENAL DIALYSIS: ICD-10-CM

## 2024-09-24 DIAGNOSIS — I12.9 RENAL HYPERTENSION: ICD-10-CM

## 2024-09-24 LAB
ALBUMIN SERPL BCP-MCNC: 4 G/DL (ref 3.5–5.2)
ALP SERPL-CCNC: 94 U/L (ref 55–135)
ALT SERPL W/O P-5'-P-CCNC: 16 U/L (ref 10–44)
ANION GAP SERPL CALC-SCNC: 16 MMOL/L (ref 8–16)
AST SERPL-CCNC: 22 U/L (ref 10–40)
BASOPHILS # BLD AUTO: 0.09 K/UL (ref 0–0.2)
BASOPHILS NFR BLD: 1.6 % (ref 0–1.9)
BILIRUB SERPL-MCNC: 0.6 MG/DL (ref 0.1–1)
BUN SERPL-MCNC: 38 MG/DL (ref 6–20)
CALCIUM SERPL-MCNC: 10.3 MG/DL (ref 8.7–10.5)
CHLORIDE SERPL-SCNC: 98 MMOL/L (ref 95–110)
CO2 SERPL-SCNC: 26 MMOL/L (ref 23–29)
CREAT SERPL-MCNC: 9.4 MG/DL (ref 0.5–1.4)
DIFFERENTIAL METHOD BLD: ABNORMAL
EOSINOPHIL # BLD AUTO: 0.3 K/UL (ref 0–0.5)
EOSINOPHIL NFR BLD: 4.5 % (ref 0–8)
ERYTHROCYTE [DISTWIDTH] IN BLOOD BY AUTOMATED COUNT: 14.6 % (ref 11.5–14.5)
EST. GFR  (NO RACE VARIABLE): 5.9 ML/MIN/1.73 M^2
GLUCOSE SERPL-MCNC: 103 MG/DL (ref 70–110)
HCT VFR BLD AUTO: 38.4 % (ref 40–54)
HGB BLD-MCNC: 12 G/DL (ref 14–18)
IMM GRANULOCYTES # BLD AUTO: 0.04 K/UL (ref 0–0.04)
IMM GRANULOCYTES NFR BLD AUTO: 0.7 % (ref 0–0.5)
LYMPHOCYTES # BLD AUTO: 1.4 K/UL (ref 1–4.8)
LYMPHOCYTES NFR BLD: 24.6 % (ref 18–48)
MCH RBC QN AUTO: 29.3 PG (ref 27–31)
MCHC RBC AUTO-ENTMCNC: 31.3 G/DL (ref 32–36)
MCV RBC AUTO: 94 FL (ref 82–98)
MONOCYTES # BLD AUTO: 0.8 K/UL (ref 0.3–1)
MONOCYTES NFR BLD: 13.6 % (ref 4–15)
NEUTROPHILS # BLD AUTO: 3.1 K/UL (ref 1.8–7.7)
NEUTROPHILS NFR BLD: 55 % (ref 38–73)
NRBC BLD-RTO: 0 /100 WBC
OHS QRS DURATION: 96 MS
OHS QTC CALCULATION: 459 MS
PLATELET # BLD AUTO: 170 K/UL (ref 150–450)
PMV BLD AUTO: 11.3 FL (ref 9.2–12.9)
POTASSIUM SERPL-SCNC: 4.7 MMOL/L (ref 3.5–5.1)
PROT SERPL-MCNC: 8 G/DL (ref 6–8.4)
RBC # BLD AUTO: 4.1 M/UL (ref 4.6–6.2)
SODIUM SERPL-SCNC: 140 MMOL/L (ref 136–145)
WBC # BLD AUTO: 5.58 K/UL (ref 3.9–12.7)

## 2024-09-24 PROCEDURE — 36415 COLL VENOUS BLD VENIPUNCTURE: CPT | Mod: PO | Performed by: NURSE PRACTITIONER

## 2024-09-24 PROCEDURE — 85025 COMPLETE CBC W/AUTO DIFF WBC: CPT | Performed by: NURSE PRACTITIONER

## 2024-09-24 PROCEDURE — 99214 OFFICE O/P EST MOD 30 MIN: CPT | Mod: S$GLB,,, | Performed by: NURSE PRACTITIONER

## 2024-09-24 PROCEDURE — 1160F RVW MEDS BY RX/DR IN RCRD: CPT | Mod: CPTII,S$GLB,, | Performed by: NURSE PRACTITIONER

## 2024-09-24 PROCEDURE — 99999 PR PBB SHADOW E&M-EST. PATIENT-LVL III: CPT | Mod: PBBFAC,,, | Performed by: NURSE PRACTITIONER

## 2024-09-24 PROCEDURE — 3077F SYST BP >= 140 MM HG: CPT | Mod: CPTII,S$GLB,, | Performed by: NURSE PRACTITIONER

## 2024-09-24 PROCEDURE — 93010 ELECTROCARDIOGRAM REPORT: CPT | Mod: S$GLB,,, | Performed by: INTERNAL MEDICINE

## 2024-09-24 PROCEDURE — 3008F BODY MASS INDEX DOCD: CPT | Mod: CPTII,S$GLB,, | Performed by: NURSE PRACTITIONER

## 2024-09-24 PROCEDURE — 3079F DIAST BP 80-89 MM HG: CPT | Mod: CPTII,S$GLB,, | Performed by: NURSE PRACTITIONER

## 2024-09-24 PROCEDURE — 93005 ELECTROCARDIOGRAM TRACING: CPT | Mod: S$GLB,,, | Performed by: NURSE PRACTITIONER

## 2024-09-24 PROCEDURE — 1159F MED LIST DOCD IN RCRD: CPT | Mod: CPTII,S$GLB,, | Performed by: NURSE PRACTITIONER

## 2024-09-24 PROCEDURE — 80053 COMPREHEN METABOLIC PANEL: CPT | Performed by: NURSE PRACTITIONER

## 2024-09-24 RX ORDER — ZOLPIDEM TARTRATE 10 MG/1
10 TABLET ORAL NIGHTLY
COMMUNITY

## 2024-09-24 RX ORDER — SUCROFERRIC OXYHYDROXIDE 500 MG/1
2 TABLET, CHEWABLE ORAL
COMMUNITY
Start: 2024-06-13

## 2024-09-24 RX ORDER — TENAPANOR 31.9 MG/1
1 TABLET, FILM COATED ORAL
COMMUNITY
Start: 2024-02-20

## 2024-09-24 RX ORDER — SODIUM ZIRCONIUM CYCLOSILICATE 10 G/10G
1 POWDER, FOR SUSPENSION ORAL
COMMUNITY
Start: 2024-08-25

## 2024-09-24 NOTE — PROGRESS NOTES
Subjective:       Patient ID: Erasto Low is a 58 y.o. male.    Chief Complaint: Pre-op Exam    History of Present Illness    Chief Complaint He reports a history of multiple surgeries, including shoulder surgery, hip surgery, and two kidney transplants. He is scheduled for right knee arthroscopy for meniscus repair on Thursday at [facility]. He has obtained surgical clearance from his physician and nephrologist, Dr. Jimenez. He denies any previous problems with anesthesia. He plans to stay with his parents post-operatively for assistance with transportation, as he has been advised not to drive for 2-3 days post-surgery due to concerns about operating the brake pedal. He undergoes dialysis on a Monday, Wednesday, Friday schedule. He reports having multiple specialists involved in his care, including Dr. Lockett, an orthopedic surgeon who has previously performed his shoulder and hip surgeries and is now planning the knee procedure.  He is a non-smoker and denies alcohol consumption. He is awaiting pending lab work for preoperative evaluation. The results will be available tomorrow and will be faxed to Dr. Lockett.  No daily anticoagulant use      ROS:  General: -fever, -chills, -fatigue, -weight gain, -weight loss  Eyes: -vision changes, -redness, -discharge  ENT: -ear pain, -nasal congestion, -sore throat  Cardiovascular: -chest pain, -palpitations, -lower extremity edema  Respiratory: -cough, -shortness of breath  Gastrointestinal: -abdominal pain, -nausea, -vomiting, -diarrhea, -constipation, -blood in stool  Genitourinary: -dysuria, -hematuria, -frequency  Musculoskeletal: -joint pain, -muscle pain  Skin: -rash, -lesion  Neurological: -headache, -dizziness, -numbness, -tingling  Psychiatric: -anxiety, -depression, -sleep difficulty        Past Medical History:   Diagnosis Date    Alport syndrome     Anemia in chronic kidney disease(285.21)     Awaiting organ transplant 10/2/2014    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      clonazePAM (KLONOPIN) 0.5 MG tablet Take 0.5 mg by mouth.      iron sucrose in NS (VENOFER) 100 mg/100 mL PgBk 50 mg.      LOKELMA 10 gram packet Take 1 packet by mouth.      methoxy peg-epoetin beta (MIRCERA INJ) 30 mcg.      multivitamin capsule Take 1 capsule by mouth once daily.      multivitamin with folic acid 400 mcg Tab Take 1 tablet by mouth.      VELPHORO 500 mg Chew Take 2 tablets by mouth.      XPHOZAH 30 mg Tab Take 1 tablet by mouth.      zolpidem (AMBIEN) 10 mg Tab Take 10 mg by mouth every evening.       No current facility-administered medications on file prior to visit.          Objective:      Physical Exam    General: In no acute distress.  Head: Normocephalic. Non traumatic.  Eyes: PERRLA. EOMs full. Conjunctivae clear. Fundi grossly normal.  Ears: EACs clear. TMs normal.  Nose: Mucosa pink. Mucosa moist. No obstruction.  Throat: Clear. No exudates. No lesions.  Neck: Supple. No masses. No thyromegaly. No bruits.  Chest: Lungs clear. No rales. No rhonchi. No wheezes.  Heart: RRR. No murmurs. No rubs. No gallops.  Abdomen: Soft. No tenderness. No masses. BS normal.  : Normal external genitalia. No lesions. No discharge. No hernias  noted.  Back: Normal curvature. No scoliosis. No tenderness.  Extremities: Warm. Well perfused. No upper extremity edema. No lower extremity edema. FROM. No deformities. No joint erythema.  Neuro: No focal deficits appreciated. Good muscle tone. Normal response to visual stimuli. Normal response to auditory stimuli.  Skin: Normal. No rashes. No lesions noted.          Assessment/Plan:     1.  Preoperative clearance    2. Renal hypertension    3. Renal dialysis status(V45.11)       Assessment & Plan    RIGHT KNEE MENISCUS INJURY:  -    CHRONIC KIDNEY DISEASE:  - Confirmed patient's dialysis schedule (Monday, Wednesday, Friday) to ensure surgery is scheduled between dialysis days.  - Verified clearance from Dr. Jimenez (nephrologist) for surgery.  - Patient to arrange for transportation to dialysis the day following surgery.    PRE-OPERATIVE ASSESSMENT:  - Assessed patient's cardiovascular and respiratory status, finding no concerns.  - Blood work ordered.    Labs are reviewed.  Providing patient has received nephrology clearance and both patient and surgeon accepts risks, ok to proceed              No follow-ups on file.    This note was generated with the assistance of ambient listening technology. Verbal consent was obtained by the patient and accompanying visitor(s) for the recording of patient appointment to facilitate this note. I attest to having reviewed and edited the generated note for accuracy, though some syntax or spelling errors may persist. Please contact the author of this note for any clarification.

## 2024-09-24 NOTE — TELEPHONE ENCOUNTER
----- Message from Margarita Hoffmann sent at 9/24/2024  8:18 AM CDT -----  Contact: Nerissa @ [phone]  1MEDICALADVICE     Patient is calling for Medical Advice regarding:MOM is calling to check if paperwork was received from Dr.Brent Lockett. Mom need to know because patient come in for appointment     How long has patient had these symptoms:    Pharmacy name and phone#:    Patient wants a call back or thru [facility]:call     Comments:    Please advise patient replies from provider may take up to 48 hours.

## 2025-01-07 ENCOUNTER — CLINICAL SUPPORT (OUTPATIENT)
Dept: AUDIOLOGY | Facility: CLINIC | Age: 60
End: 2025-01-07
Payer: MEDICARE

## 2025-01-07 DIAGNOSIS — H90.3 SENSORINEURAL HEARING LOSS OF BOTH EARS: Primary | ICD-10-CM

## 2025-01-07 NOTE — PROGRESS NOTES
Erasto Low was seen 01/07/2025 for hearing aid follow-up.  Patient reports he is doing well with the aids. Both aids were clean and checked (replaced cerustop), and found to be in good working order. Data logging results look good. No program adjustments were needed. Patient says Shavon is easily activated but declined having tap control disabled for just Shavon. Since patient is maintaining aids well, I recommend annual follow-up with audiogram. Otoscopy revealed some cerumen impaction - scheduled patient with ENT.

## 2025-01-14 ENCOUNTER — OFFICE VISIT (OUTPATIENT)
Dept: OTOLARYNGOLOGY | Facility: CLINIC | Age: 60
End: 2025-01-14
Payer: MEDICARE

## 2025-01-14 VITALS — BODY MASS INDEX: 24.29 KG/M2 | HEIGHT: 68 IN | WEIGHT: 160.25 LBS

## 2025-01-14 DIAGNOSIS — H61.23 BILATERAL IMPACTED CERUMEN: Primary | ICD-10-CM

## 2025-01-14 PROCEDURE — 99499 UNLISTED E&M SERVICE: CPT | Mod: 25,S$GLB,, | Performed by: OTOLARYNGOLOGY

## 2025-01-14 PROCEDURE — 69210 REMOVE IMPACTED EAR WAX UNI: CPT | Mod: S$GLB,,, | Performed by: OTOLARYNGOLOGY

## 2025-01-14 PROCEDURE — 99999 PR PBB SHADOW E&M-EST. PATIENT-LVL III: CPT | Mod: PBBFAC,,, | Performed by: OTOLARYNGOLOGY

## 2025-01-14 NOTE — PROGRESS NOTES
REFERRING PROVIDER  No referring provider defined for this encounter.  Subjective:   Patient: Erasto Low [id number], :1965   Visit date:2025 10:40 AM    Chief Complaint:  Cerumen Impaction (Pt states no problems at this time)        HPI:     Erasto Low is a 59 y.o. male whom I am asked to see for evaluation of otalgia or hearing loss in both ears for the past 1-4 weeks.   Erasto rates the severity as moderate.  No exacerbating or relieving factors.  There is no drainage from the ears.      His meds, allergies, medical, surgical, social & family histories were reviewed & updated:  -     He has a current medication list which includes the following prescription(s): acetaminophen, clonazepam, lokelma, methoxy peg-epoetin beta, multivitamin, multivitamin with folic acid, velphoro, xphozah, and zolpidem.  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   -     He does not have any pertinent problems on file.   -     He  has a past surgical history that includes Kidney transplant; Kidney transplant; Hip Arthroplasty; Total shoulder arthroplasty; Nephrectomy (); AV fistula placement; Parathyroidectomy; Hernia repair; Lung removal, partial; Kidney surgery; Prostatectomy (); and Colonoscopy (N/A, 10/8/2020).  -     He  reports that he has never smoked. He has never been exposed to tobacco smoke. His smokeless tobacco use includes snuff. He reports current alcohol use. He reports that he does not use drugs.  -     His family history includes Hypertension in his brother, father, maternal uncle, and mother; Kidney disease in his brother, maternal uncle, and mother.  -     He is allergic to  "ether, cyclosporine, and ether.      Review of Systems:  -     Allergic/Immunologic: is allergic to ether, cyclosporine, and ether..  -     Constitutional: Current temp:          Objective:     Physical Exam:  Vitals:  Ht 5' 8" (1.727 m)   Wt 72.7 kg (160 lb 4.4 oz)   BMI 24.37 kg/m²   Communication:  Able to communicate, no hoarseness.  Head & Face:  Normocephalic, atraumatic, no sinus tenderness.  Eyes:  Extraocular motions intact.  Ears:  Otoscopy of external auditory canals reveals impaction of bilateral ear canals.  With the patient in the supine position, we used the operating microscope to examine both ears with the appropriate sized ear speculum.  A variety of sterile, micro-instruments were utilized to remove the cerumen atraumatically from the impacted ear(s).   After removal, the ears were reexamined-  Right Ear:  No mass/lesion of auricle. The external auditory canals is without erythema or discharge. Pneumatic otoscopy of the tympanic membrane revealed no perforation and good mobility, with no fluid in middle ear. Clinical speech reception thresholds grossly normal.  Left Ear:  No mass/lesion of auricle. The external auditory canals is without erythema or discharge. Pneumatic otoscopy of the tympanic membrane revealed no perforation and good mobility, with no fluid in middle ear. Clinical speech reception thresholds grossly normal  Nose:  No masses/lesions of external nose, nasal mucosa, septum, and turbinates were within normal limits.  Mouth:  No mass/lesion of lips, teeth, gums, hard/soft palate, tongue, tonsils, or oropharynx.  Neck & Lymphatics:  No cervical lymphadenopathy, no neck mass/crepitus/ asymmetry, trachea is midline, no thyroid enlargement/tenderness/mass.  Neuro/Psych: Alert with normal mood and affect.   Respiration/Chest:  Symmetric expansion during respiration, normal respiratory effort.  Skin:  Warm and intact.    Assessment & Plan:       -     Cerumen Impaction - Erasto has cerumen " impaction.  We discussed preventative measures and treatment options.  Q-tips must be avoided, instead the ears can be cleaned with OTC ear rinses (or a mixture of alcohol & vinegar in equal parts).   For hard wax, Erasto may place mineral oil/baby oil in the ear with a cotton ball at night and remove in the shower.  This will assist in softening the wax and allow it to drain out on its own. If the cerumen impacts the ear canal and causes hearing loss or infection he needs to follow-up in the clinic for treatment and cleaning.               [Subsequent Evaluation] : a subsequent evaluation for [FreeTextEntry2] : sinusitis / nasal discharge w

## 2025-07-17 ENCOUNTER — OFFICE VISIT (OUTPATIENT)
Dept: OTOLARYNGOLOGY | Facility: CLINIC | Age: 60
End: 2025-07-17
Payer: MEDICARE

## 2025-07-17 VITALS — HEIGHT: 68 IN | BODY MASS INDEX: 24.37 KG/M2

## 2025-07-17 DIAGNOSIS — H61.23 IMPACTED CERUMEN, BILATERAL: Primary | ICD-10-CM

## 2025-07-17 PROCEDURE — 99999 PR PBB SHADOW E&M-EST. PATIENT-LVL III: CPT | Mod: PBBFAC,,, | Performed by: OTOLARYNGOLOGY

## 2025-07-17 PROCEDURE — 69210 REMOVE IMPACTED EAR WAX UNI: CPT | Mod: S$GLB,,, | Performed by: OTOLARYNGOLOGY

## 2025-07-17 PROCEDURE — 99499 UNLISTED E&M SERVICE: CPT | Mod: 25,S$GLB,, | Performed by: OTOLARYNGOLOGY

## 2025-07-17 NOTE — PROGRESS NOTES
REFERRING PROVIDER  No referring provider defined for this encounter.  Subjective:   Patient: Erasto Low [id number], :1965   Visit date:2025 11:35 AM    Chief Complaint:  Cerumen Impaction (6 month ear cleaning)        HPI:     Erasto Low is a 59 y.o. male whom I am asked to see for evaluation of otalgia or hearing loss in both ears for the past 1-4 weeks.   Erasto rates the severity as moderate.  No exacerbating or relieving factors.  There is no drainage from the ears.      His meds, allergies, medical, surgical, social & family histories were reviewed & updated:  -     He has a current medication list which includes the following prescription(s): acetaminophen, clonazepam, lokelma, methoxy peg-epoetin beta, multivitamin, multivitamin with folic acid, velphoro, xphozah, and zolpidem.  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   -     He does not have any pertinent problems on file.   -     He  has a past surgical history that includes Kidney transplant; Kidney transplant; Hip Arthroplasty; Total shoulder arthroplasty; Nephrectomy (); AV fistula placement; Parathyroidectomy; Hernia repair; Lung removal, partial; Kidney surgery; Prostatectomy (); and Colonoscopy (N/A, 10/8/2020).  -     He  reports that he has never smoked. He has never been exposed to tobacco smoke. His smokeless tobacco use includes snuff. He reports current alcohol use. He reports that he does not use drugs.  -     His family history includes Hypertension in his brother, father, maternal uncle, and mother; Kidney disease in his brother, maternal uncle, and mother.  -     He is allergic to ether,  "cyclosporine, and ether.      Review of Systems:  -     Allergic/Immunologic: is allergic to ether, cyclosporine, and ether..  -     Constitutional: Current temp:          Objective:     Physical Exam:  Vitals:  Ht 5' 8" (1.727 m)   BMI 24.37 kg/m²   Communication:  Able to communicate, no hoarseness.  Head & Face:  Normocephalic, atraumatic, no sinus tenderness.  Eyes:  Extraocular motions intact.  Ears:  Otoscopy of external auditory canals reveals impaction of bilateral ear canals.      PROCEDURE NOTE:    With the patient in the supine position, we used the operating microscope to examine both ears with the appropriate sized ear speculum.  A variety of sterile, micro-instruments were utilized to remove the cerumen atraumatically from the impacted ear(s).   After removal, the ears were reexamined, noted to be free of cerumen and with bilateral intact tympanic membranes    Right Ear:  No mass/lesion of auricle. The external auditory canals is without erythema or discharge. Pneumatic otoscopy of the tympanic membrane revealed no perforation and good mobility, with no fluid in middle ear. Clinical speech reception thresholds grossly normal.  Left Ear:  No mass/lesion of auricle. The external auditory canals is without erythema or discharge. Pneumatic otoscopy of the tympanic membrane revealed no perforation and good mobility, with no fluid in middle ear. Clinical speech reception thresholds grossly normal  Nose:  No masses/lesions of external nose, nasal mucosa, septum, and turbinates were within normal limits.  Mouth:  No mass/lesion of lips, teeth, gums, hard/soft palate, tongue, tonsils, or oropharynx.  Neck & Lymphatics:  No cervical lymphadenopathy, no neck mass/crepitus/ asymmetry, trachea is midline, no thyroid enlargement/tenderness/mass.  Neuro/Psych: Alert with normal mood and affect.   Respiration/Chest:  Symmetric expansion during respiration, normal respiratory effort.  Skin:  Warm and " intact.    Assessment & Plan:       -     Cerumen Impaction - Erasto has cerumen impaction.  We discussed preventative measures and treatment options.  Q-tips must be avoided, instead the ears can be cleaned with OTC ear rinses (or a mixture of alcohol & vinegar in equal parts).   For hard wax, Erasto may place mineral oil/baby oil in the ear with a cotton ball at night and remove in the shower.  This will assist in softening the wax and allow it to drain out on its own. If the cerumen impacts the ear canal and causes hearing loss or infection he needs to follow-up in the clinic for treatment and cleaning.

## 2025-08-06 ENCOUNTER — TELEPHONE (OUTPATIENT)
Dept: AUDIOLOGY | Facility: CLINIC | Age: 60
End: 2025-08-06
Payer: MEDICARE

## 2025-08-06 NOTE — TELEPHONE ENCOUNTER
Copied from CRM #[id number]. Topic: General Inquiry - Patient Advice  >> Aug 6, 2025  3:51 PM Zhane wrote:  Type:  Patient Returning Call    Who Called:Erasto  Who Left Message for Patient:  Does the patient know what this is regarding?:Hearing aid  Would the patient rather a call back or a response via [facility]? Callback  Best Call Back Number:[phone]  Additional Information: Need a callback